# Patient Record
Sex: FEMALE | Race: WHITE | NOT HISPANIC OR LATINO | Employment: FULL TIME | URBAN - METROPOLITAN AREA
[De-identification: names, ages, dates, MRNs, and addresses within clinical notes are randomized per-mention and may not be internally consistent; named-entity substitution may affect disease eponyms.]

---

## 2017-01-06 ENCOUNTER — HOSPITAL ENCOUNTER (OUTPATIENT)
Dept: LAB | Facility: HOSPITAL | Age: 42
Setting detail: SPECIMEN
Discharge: HOME | End: 2017-01-06
Attending: NURSE PRACTITIONER | Admitting: NURSE PRACTITIONER

## 2017-01-06 LAB
ALBUMIN SERPL-MCNC: 3.6 G/DL (ref 3.5–4.8)
ALBUMIN/GLOB SERPL: 1 {RATIO} (ref 1–1.7)
ALP SERPL-CCNC: 119 IU/L (ref 32–91)
ALT SERPL-CCNC: 37 IU/L (ref 14–54)
ANION GAP SERPL CALC-SCNC: 19.9 MMOL/L (ref 10–20)
AST SERPL-CCNC: 28 IU/L (ref 15–41)
BILIRUB SERPL-MCNC: 0.5 MG/DL (ref 0.3–1.2)
BUN SERPL-MCNC: 14 MG/DL (ref 8–20)
BUN/CREAT SERPL: 20 (ref 5.4–26.2)
CALCIUM SERPL-MCNC: 9.6 MG/DL (ref 8.9–10.3)
CHLORIDE SERPL-SCNC: 100 MMOL/L (ref 101–111)
CHOLEST SERPL-MCNC: 267 MG/DL
CHOLEST/HDLC SERPL: 3.7 {RATIO}
CONV CO2: 21 MMOL/L (ref 22–32)
CONV LDL CHOLESTEROL DIRECT: 159 MG/DL (ref 0–100)
CONV MICROALBUM.,U,RANDOM: 10 MG/L
CONV TOTAL PROTEIN: 7.1 G/DL (ref 6.1–7.9)
CREAT 24H UR-MCNC: 75 MG/DL
CREAT UR-MCNC: 0.7 MG/DL (ref 0.4–1)
GLOBULIN UR ELPH-MCNC: 3.5 G/DL (ref 2.5–3.8)
GLUCOSE SERPL-MCNC: 241 MG/DL (ref 65–99)
HDLC SERPL-MCNC: 72 MG/DL
LDLC/HDLC SERPL: 2.2 {RATIO}
LIPID INTERPRETATION: ABNORMAL
MICROALBUMIN/CREAT UR: 13.3 UG/MG
POTASSIUM SERPL-SCNC: 3.9 MMOL/L (ref 3.6–5.1)
SODIUM SERPL-SCNC: 137 MMOL/L (ref 136–144)
TRIGL SERPL-MCNC: 193 MG/DL
VLDLC SERPL CALC-MCNC: 36.1 MG/DL

## 2017-01-13 ENCOUNTER — CONVERSION ENCOUNTER (OUTPATIENT)
Dept: ENDOCRINOLOGY | Facility: CLINIC | Age: 42
End: 2017-01-13

## 2017-04-12 ENCOUNTER — HOSPITAL ENCOUNTER (OUTPATIENT)
Dept: LAB | Facility: HOSPITAL | Age: 42
Setting detail: SPECIMEN
Discharge: HOME | End: 2017-04-12
Attending: INTERNAL MEDICINE | Admitting: INTERNAL MEDICINE

## 2017-04-12 LAB
ALBUMIN SERPL-MCNC: 3.7 G/DL (ref 3.5–4.8)
ALBUMIN/GLOB SERPL: 1.2 {RATIO} (ref 1–1.7)
ALP SERPL-CCNC: 102 IU/L (ref 32–91)
ALT SERPL-CCNC: 45 IU/L (ref 14–54)
ANION GAP SERPL CALC-SCNC: 14.4 MMOL/L (ref 10–20)
AST SERPL-CCNC: 37 IU/L (ref 15–41)
BILIRUB SERPL-MCNC: 0.5 MG/DL (ref 0.3–1.2)
BUN SERPL-MCNC: 19 MG/DL (ref 8–20)
BUN/CREAT SERPL: 31.7 (ref 5.4–26.2)
CALCIUM SERPL-MCNC: 8.9 MG/DL (ref 8.9–10.3)
CHLORIDE SERPL-SCNC: 100 MMOL/L (ref 101–111)
CHOLEST SERPL-MCNC: 194 MG/DL
CHOLEST/HDLC SERPL: 2.9 {RATIO}
CONV CO2: 24 MMOL/L (ref 22–32)
CONV LDL CHOLESTEROL DIRECT: 112 MG/DL (ref 0–100)
CONV MICROALBUM.,U,RANDOM: 4 MG/L
CONV TOTAL PROTEIN: 6.9 G/DL (ref 6.1–7.9)
CREAT 24H UR-MCNC: 82.1 MG/DL
CREAT UR-MCNC: 0.6 MG/DL (ref 0.4–1)
GLOBULIN UR ELPH-MCNC: 3.2 G/DL (ref 2.5–3.8)
GLUCOSE SERPL-MCNC: 235 MG/DL (ref 65–99)
HDLC SERPL-MCNC: 67 MG/DL
LDLC/HDLC SERPL: 1.7 {RATIO}
LIPID INTERPRETATION: ABNORMAL
MICROALBUMIN/CREAT UR: 4.9 UG/MG
POTASSIUM SERPL-SCNC: 4.4 MMOL/L (ref 3.6–5.1)
SODIUM SERPL-SCNC: 134 MMOL/L (ref 136–144)
TRIGL SERPL-MCNC: 119 MG/DL
VLDLC SERPL CALC-MCNC: 15.1 MG/DL

## 2017-04-19 ENCOUNTER — CONVERSION ENCOUNTER (OUTPATIENT)
Dept: ENDOCRINOLOGY | Facility: CLINIC | Age: 42
End: 2017-04-19

## 2017-12-08 ENCOUNTER — CONVERSION ENCOUNTER (OUTPATIENT)
Dept: ENDOCRINOLOGY | Facility: CLINIC | Age: 42
End: 2017-12-08

## 2018-02-27 ENCOUNTER — HOSPITAL ENCOUNTER (OUTPATIENT)
Dept: LAB | Facility: HOSPITAL | Age: 43
Setting detail: SPECIMEN
Discharge: HOME | End: 2018-02-27
Attending: INTERNAL MEDICINE | Admitting: INTERNAL MEDICINE

## 2018-02-27 LAB
ALBUMIN SERPL-MCNC: 3.9 G/DL (ref 3.5–4.8)
ALBUMIN/GLOB SERPL: 1.2 {RATIO} (ref 1–1.7)
ALP SERPL-CCNC: 95 IU/L (ref 32–91)
ALT SERPL-CCNC: 47 IU/L (ref 14–54)
ANION GAP SERPL CALC-SCNC: 12.3 MMOL/L (ref 10–20)
AST SERPL-CCNC: 34 IU/L (ref 15–41)
BILIRUB SERPL-MCNC: 0.5 MG/DL (ref 0.3–1.2)
BUN SERPL-MCNC: 12 MG/DL (ref 8–20)
BUN/CREAT SERPL: 20 (ref 5.4–26.2)
CALCIUM SERPL-MCNC: 9.4 MG/DL (ref 8.9–10.3)
CHLORIDE SERPL-SCNC: 101 MMOL/L (ref 101–111)
CHOLEST SERPL-MCNC: 165 MG/DL
CHOLEST/HDLC SERPL: 2.5 {RATIO}
CONV CO2: 26 MMOL/L (ref 22–32)
CONV LDL CHOLESTEROL DIRECT: 82 MG/DL (ref 0–100)
CONV MICROALBUM.,U,RANDOM: 3 MG/L
CONV TOTAL PROTEIN: 7.2 G/DL (ref 6.1–7.9)
CREAT 24H UR-MCNC: 96.1 MG/DL
CREAT UR-MCNC: 0.6 MG/DL (ref 0.4–1)
GLOBULIN UR ELPH-MCNC: 3.3 G/DL (ref 2.5–3.8)
GLUCOSE SERPL-MCNC: 227 MG/DL (ref 65–99)
HDLC SERPL-MCNC: 66 MG/DL
LDLC/HDLC SERPL: 1.2 {RATIO}
LIPID INTERPRETATION: NORMAL
MICROALBUMIN/CREAT UR: 3.1 UG/MG
POTASSIUM SERPL-SCNC: 4.3 MMOL/L (ref 3.6–5.1)
SODIUM SERPL-SCNC: 135 MMOL/L (ref 136–144)
TRIGL SERPL-MCNC: 68 MG/DL
VLDLC SERPL CALC-MCNC: 17.7 MG/DL

## 2018-03-08 ENCOUNTER — CONVERSION ENCOUNTER (OUTPATIENT)
Dept: ENDOCRINOLOGY | Facility: CLINIC | Age: 43
End: 2018-03-08

## 2018-11-13 ENCOUNTER — CONVERSION ENCOUNTER (OUTPATIENT)
Dept: ENDOCRINOLOGY | Facility: CLINIC | Age: 43
End: 2018-11-13

## 2019-04-30 ENCOUNTER — CONVERSION ENCOUNTER (OUTPATIENT)
Dept: ENDOCRINOLOGY | Facility: CLINIC | Age: 44
End: 2019-04-30

## 2019-06-03 VITALS
OXYGEN SATURATION: 98 % | HEIGHT: 62 IN | WEIGHT: 161 LBS | HEART RATE: 121 BPM | WEIGHT: 178 LBS | HEART RATE: 114 BPM | OXYGEN SATURATION: 99 % | BODY MASS INDEX: 29.45 KG/M2 | HEART RATE: 112 BPM | DIASTOLIC BLOOD PRESSURE: 65 MMHG | OXYGEN SATURATION: 98 % | DIASTOLIC BLOOD PRESSURE: 75 MMHG | SYSTOLIC BLOOD PRESSURE: 110 MMHG | WEIGHT: 187 LBS | WEIGHT: 171 LBS | SYSTOLIC BLOOD PRESSURE: 102 MMHG | WEIGHT: 172 LBS | HEART RATE: 109 BPM | OXYGEN SATURATION: 97 % | BODY MASS INDEX: 31.46 KG/M2 | HEIGHT: 62 IN | HEIGHT: 62 IN | BODY MASS INDEX: 32.76 KG/M2 | SYSTOLIC BLOOD PRESSURE: 112 MMHG | DIASTOLIC BLOOD PRESSURE: 78 MMHG | HEART RATE: 117 BPM | OXYGEN SATURATION: 95 % | BODY MASS INDEX: 34.41 KG/M2 | BODY MASS INDEX: 30.36 KG/M2 | DIASTOLIC BLOOD PRESSURE: 80 MMHG | SYSTOLIC BLOOD PRESSURE: 120 MMHG | SYSTOLIC BLOOD PRESSURE: 110 MMHG | WEIGHT: 166 LBS | BODY MASS INDEX: 31.47 KG/M2 | DIASTOLIC BLOOD PRESSURE: 80 MMHG | SYSTOLIC BLOOD PRESSURE: 110 MMHG | HEART RATE: 113 BPM | DIASTOLIC BLOOD PRESSURE: 70 MMHG | OXYGEN SATURATION: 98 %

## 2019-07-25 ENCOUNTER — TELEPHONE (OUTPATIENT)
Dept: ENDOCRINOLOGY | Facility: CLINIC | Age: 44
End: 2019-07-25

## 2019-07-25 NOTE — TELEPHONE ENCOUNTER
CALLED PATIENT TO RE-SCHEDULE HER 07/30 APPT WITH DR. HAYS..HAD TO LEAVE HER A MESSAGE TO CALL THE [facility]

## 2019-08-15 RX ORDER — BLOOD-GLUCOSE METER
EACH MISCELLANEOUS
Qty: 1 KIT | Refills: 0 | Status: SHIPPED | OUTPATIENT
Start: 2019-08-15 | End: 2020-02-20

## 2019-10-30 RX ORDER — BLOOD-GLUCOSE METER
EACH MISCELLANEOUS
Qty: 1 KIT | Refills: 0 | OUTPATIENT
Start: 2019-10-30

## 2019-10-30 RX ORDER — LISINOPRIL 2.5 MG/1
TABLET ORAL
Qty: 90 TABLET | Refills: 0 | Status: SHIPPED | OUTPATIENT
Start: 2019-10-30 | End: 2020-01-09

## 2019-10-30 RX ORDER — ROSUVASTATIN CALCIUM 10 MG/1
TABLET, COATED ORAL
Qty: 90 TABLET | Refills: 0 | Status: SHIPPED | OUTPATIENT
Start: 2019-10-30 | End: 2020-01-09

## 2019-12-16 ENCOUNTER — TELEPHONE (OUTPATIENT)
Dept: ENDOCRINOLOGY | Facility: CLINIC | Age: 44
End: 2019-12-16

## 2019-12-16 NOTE — TELEPHONE ENCOUNTER
PATIENT CALLED AND LEFT A VM THAT SHE NEEDS AN APPT WITH DR. HAYS... CALLED HER BACK AND I HAD TO LEAVE HER A VM TO CALL ME BACK AT THE [facility].

## 2020-01-09 RX ORDER — LISINOPRIL 2.5 MG/1
TABLET ORAL
Qty: 90 TABLET | Refills: 0 | Status: SHIPPED | OUTPATIENT
Start: 2020-01-09 | End: 2020-03-25

## 2020-01-09 RX ORDER — ROSUVASTATIN CALCIUM 10 MG/1
TABLET, COATED ORAL
Qty: 90 TABLET | Refills: 0 | Status: SHIPPED | OUTPATIENT
Start: 2020-01-09 | End: 2020-03-25

## 2020-01-15 ENCOUNTER — TELEPHONE (OUTPATIENT)
Dept: ENDOCRINOLOGY | Facility: CLINIC | Age: 45
End: 2020-01-15

## 2020-01-15 NOTE — TELEPHONE ENCOUNTER
Pt called to schedule hospital fu. Pt states that she was admitted to [facility] for DKA and discharged yeaterday. Pt states that she was told to fu with Dr. Lorenz within a week or 2. Pt scheduled 1/16 @ 3:00 (approved by Sarai DILLARD). Discharge summary and labs requested from [facility]. DC summary not available yet. Will check back tomorrow.

## 2020-01-16 ENCOUNTER — OFFICE VISIT (OUTPATIENT)
Dept: ENDOCRINOLOGY | Facility: CLINIC | Age: 45
End: 2020-01-16

## 2020-01-16 VITALS
BODY MASS INDEX: 33.68 KG/M2 | OXYGEN SATURATION: 99 % | SYSTOLIC BLOOD PRESSURE: 120 MMHG | WEIGHT: 183 LBS | HEIGHT: 62 IN | DIASTOLIC BLOOD PRESSURE: 80 MMHG | HEART RATE: 108 BPM

## 2020-01-16 DIAGNOSIS — E78.2 MIXED HYPERLIPIDEMIA: ICD-10-CM

## 2020-01-16 DIAGNOSIS — E10.65 TYPE 1 DIABETES MELLITUS WITH HYPERGLYCEMIA (HCC): Primary | ICD-10-CM

## 2020-01-16 PROBLEM — G43.109 MIGRAINE EQUIVALENT: Status: ACTIVE | Noted: 2018-03-15

## 2020-01-16 PROBLEM — E53.8 VITAMIN B 12 DEFICIENCY: Status: ACTIVE | Noted: 2019-09-23

## 2020-01-16 PROBLEM — K59.09 CHRONIC CONSTIPATION: Status: ACTIVE | Noted: 2019-03-11

## 2020-01-16 PROBLEM — M25.50 ARTHRALGIA: Status: ACTIVE | Noted: 2019-06-28

## 2020-01-16 PROBLEM — S00.91XA ABRASION OF HEAD: Status: ACTIVE | Noted: 2017-03-08

## 2020-01-16 PROBLEM — Z80.0 FAMILY HISTORY OF COLON CANCER IN MOTHER: Status: ACTIVE | Noted: 2018-03-15

## 2020-01-16 PROCEDURE — 95251 CONT GLUC MNTR ANALYSIS I&R: CPT | Performed by: INTERNAL MEDICINE

## 2020-01-16 PROCEDURE — 99214 OFFICE O/P EST MOD 30 MIN: CPT | Performed by: INTERNAL MEDICINE

## 2020-01-16 RX ORDER — CYCLOBENZAPRINE HCL 10 MG
TABLET ORAL
COMMUNITY
Start: 2019-12-30 | End: 2020-01-16

## 2020-01-16 RX ORDER — LANCING DEVICE/LANCETS
KIT MISCELLANEOUS
COMMUNITY
Start: 2017-05-05 | End: 2020-02-25

## 2020-01-16 RX ORDER — TIZANIDINE 4 MG/1
TABLET ORAL
COMMUNITY
Start: 2019-11-23 | End: 2020-01-16

## 2020-01-16 RX ORDER — DOXYCYCLINE HYCLATE 100 MG
TABLET ORAL
COMMUNITY
Start: 2019-08-28 | End: 2021-06-15

## 2020-01-16 RX ORDER — ESCITALOPRAM OXALATE 20 MG/1
TABLET ORAL
COMMUNITY
Start: 2019-12-26

## 2020-01-16 RX ORDER — QUETIAPINE FUMARATE 50 MG/1
150 TABLET, FILM COATED ORAL NIGHTLY
COMMUNITY
Start: 2019-12-30

## 2020-01-16 RX ORDER — MELOXICAM 15 MG/1
TABLET ORAL
COMMUNITY
Start: 2017-10-30 | End: 2020-01-16

## 2020-01-16 RX ORDER — TOPIRAMATE 100 MG/1
TABLET, FILM COATED ORAL EVERY 24 HOURS
COMMUNITY
Start: 2019-04-30 | End: 2020-01-16

## 2020-01-16 RX ORDER — TEMAZEPAM 7.5 MG/1
7.5 CAPSULE ORAL
COMMUNITY
End: 2020-04-16

## 2020-01-16 RX ORDER — SUMATRIPTAN 100 MG/1
TABLET, FILM COATED ORAL
COMMUNITY
Start: 2020-01-12

## 2020-01-16 RX ORDER — BUDESONIDE AND FORMOTEROL FUMARATE DIHYDRATE 160; 4.5 UG/1; UG/1
2 AEROSOL RESPIRATORY (INHALATION)
COMMUNITY
Start: 2017-03-08 | End: 2020-01-16

## 2020-01-16 RX ORDER — FLUTICASONE PROPIONATE 50 MCG
2 SPRAY, SUSPENSION (ML) NASAL
COMMUNITY
Start: 2017-12-11 | End: 2020-01-16

## 2020-01-16 RX ORDER — IBUPROFEN 800 MG/1
TABLET ORAL
COMMUNITY
Start: 2019-12-23 | End: 2020-04-16

## 2020-01-16 RX ORDER — TOPIRAMATE 50 MG/1
TABLET, FILM COATED ORAL
COMMUNITY
Start: 2019-12-01 | End: 2022-07-11

## 2020-01-16 NOTE — PROGRESS NOTES
"Endocrine Progress Note Outpatient     Patient Care Team:  Yajaira De MD as PCP - General    Chief Complaint: Follow-up type 1 diabetes    HPI: 7-year-old female with history of type 1 diabetes and hyperlipidemia is here for follow-up.  For type 1 diabetes she is currently on Medtronic 670 G system insulin pump.  She was recently hospitalized with DKA few days ago.  Etiology of that DKA is unknown at this time.  She tells me that she was using her pump on a regular basis and she did actually change her catheter as well.  Her current insulin pump settings are as follows: Basal rates: Midnight 1.50 units/h, 6 AM 1.70 units/h, 10 PM 1.50 units/h.  Insulin carb ratio is 1 unit for each 3 g of carbohydrate.  Insulin sensitive factor is 1 unit for each 20 with a target blood sugar of 140.  She is not having many low blood sugars.    Hyperlipidemia: She is currently on Crestor.  Diabetic microvascular complications: None    Past Medical History:   Diagnosis Date   • DKA (diabetic ketoacidoses) (CMS/[facility])        Social History     Socioeconomic History   • Marital status:      Spouse name: Not on file   • Number of children: Not on file   • Years of education: Not on file   • Highest education level: Not on file   Tobacco Use   • Smoking status: Former Smoker     Types: Electronic Cigarette   Substance and Sexual Activity   • Alcohol use: Never     Frequency: Never       Family History   Problem Relation Age of Onset   • Cancer Mother         rectal / colon / skin   • Heart disease Father    • Hypertension Father    • Post-traumatic stress disorder Father        Allergies   Allergen Reactions   • Azithromycin Other (See Comments)     rash  rash     • Penicillins Rash     severe rash     • Seasonal Ic  [Cholestatin] Itching     Other reaction(s): Erythema (finding)  Adhesives; \"Paper Tape is OK\" - per pt   • Erythromycin Base Rash     childhood reaction   • Latex Rash   • Sulfa Antibiotics Rash     rash   "       ROS:   Constitutional:  Denies fatigue, tiredness.    Eyes:  Denies change in visual acuity   HENT:  Denies nasal congestion or sore throat   Respiratory: denies cough, shortness of breath.   Cardiovascular:  denies chest pain, edema   GI:  Denies abdominal pain, nausea, vomiting.   Musculoskeletal:  Denies back pain or joint pain   Integument:  Denies dry skin and rash   Neurologic:  Denies headache, focal weakness or sensory changes   Endocrine:  Denies polyuria or polydipsia   Psychiatric:  Denies depression or anxiety      Vitals:    01/16/20 1458   BP: 120/80   Pulse: 108   SpO2: 99%       Physical Exam:  GEN: NAD, conversant  EYES: EOMI, PERRL, no conjunctival erythema  NECK: no thyromegaly, full ROM   CV: RRR, no murmurs/rubs/gallops, no peripheral edema  LUNG: CTAB, no wheezes/rales/ronchi  SKIN: no rashes, no acanthosis  MSK: no deformities, full ROM of all extremities  NEURO: no tremors, DTR normal  PSYCH: AOX3, appropriate mood, affect normal      Results Review:     I reviewed the patient's new clinical results.    Lab Results   Component Value Date    HGBA1C 10.1 (H) 12/23/2019      Lab Results   Component Value Date    GLUCOSE 227 (H) 02/27/2018    BUN 16 12/23/2019    CREATININE 0.7 12/23/2019    BCR 25.0 (H) 12/23/2019    K 4.0 12/23/2019    CO2 26 12/23/2019    CALCIUM 9.6 12/23/2019    ALBUMIN 4.4 12/23/2019    LABIL2 1.3 12/23/2019    AST 16 12/23/2019    ALT 17 12/23/2019    CHOL 165 02/27/2018    TRIG 69 12/23/2019     (H) 12/23/2019    HDL 58 12/23/2019     Lab Results   Component Value Date    TSH 0.524 06/27/2019    FREET4 0.90 (L) 06/27/2019     Continuous glucose monitoring system download reviewed/interpretation: Dates covered from January 3, 2022 January 16, 2020 for 14 days.  She is a spending 18% of the time in the auto mode and 82% of the time in the manual mode.  Average blood sugar while on the auto mode is 147 and average blood sugar while checking blood sugars manually  is 209.  She is spending about 88% of the time in the target range and 12% of the time in the high range.  No low blood sugars noted on this download.    Medication Review: Reviewed.       Current Outpatient Medications:   •  Adalimumab (HUMIRA PEN-CD/UC/HS STARTER) 40 MG/0.8ML Pen-injector Kit, Inject 40 mg under the skin into the appropriate area as directed 1 (One) Time Per Week., Disp: , Rfl:   •  Blood Glucose Monitoring Suppl (ACCU-CHEK NICK PLUS) w/Device kit, Check BS  5 times daily DX E10.65, Disp: 1 kit, Rfl: 0  •  doxycycline (VIBRAMYICN) 100 MG tablet, TK 1 T PO BID, Disp: , Rfl:   •  escitalopram (LEXAPRO) 20 MG tablet, , Disp: , Rfl:   •  glucagon (GLUCAGEN HYPOKIT) 1 MG injection, GLUCAGEN HYPOKIT 1 MG SOLR, Disp: , Rfl:   •  glucose blood (ACCU-CHEK NICK) test strip, Use as instructed to check blood sugar 5 times daily pt needs to be seen for further refills, Disp: 500 each, Rfl: 0  •  ibuprofen (ADVIL,MOTRIN) 800 MG tablet, , Disp: , Rfl:   •  insulin aspart (NOVOLOG) 100 UNIT/ML injection, USE SUBCUTANEOUSLY IN PUMP-ICR 12 AM 1:12; 8 AM 1:8; 5 PM 1:9 . MAY DAILY DOSE 120 UNITS (DISCARD VIAL 28 DAYS AFTER OPEN), Disp: 110 mL, Rfl: 0  •  insulin detemir (LEVEMIR FLEXTOUCH) 100 UNIT/ML injection, , Disp: , Rfl:   •  Insulin Pen Needle (B-D UF III MINI PEN NEEDLES) 31G X 5 MM misc, BD PEN NEEDLE MINI U/F 31G X 5 MM, Disp: , Rfl:   •  Lancets Misc. (ACCU-CHEK FASTCLIX LANCET) kit, ACCU-CHEK FASTCLIX LANCET KIT, Disp: , Rfl:   •  linaclotide (LINZESS) 72 MCG capsule capsule, TK 1 C PO D, Disp: , Rfl:   •  lisinopril (PRINIVIL,ZESTRIL) 2.5 MG tablet, TAKE ONE DAILY, Disp: 90 tablet, Rfl: 0  •  QUEtiapine (SEROquel) 50 MG tablet, , Disp: , Rfl:   •  rosuvastatin (CRESTOR) 10 MG tablet, TAKE ONE DAILY, Disp: 90 tablet, Rfl: 0  •  SUMAtriptan (IMITREX) 100 MG tablet, , Disp: , Rfl:   •  temazepam (RESTORIL) 7.5 MG capsule, Take 7.5 mg by mouth., Disp: , Rfl:   •  topiramate (TOPAMAX) 50 MG tablet, , Disp:  , Rfl:       Assessment/Plan   1.  Diabetes mellitus type 1: Uncontrolled with A1c of 9.7% during recent hospitalization.  I have reviewed her continuous glucose monitor download.  She does have high blood sugars from 9 AM to about 1 PM.  I have changed her basal rate at that time from 1.70 to 1.80 units/h.  So her new basal rates are as follows midnight 1.50 units/h, 6 AM 1.70 units/h, 9 AM 1.80 units/h, 1 PM 1.70 units/h and 10 PM is 1.50 units/h.  Advised her to see if she can increase her percent time on the auto mode if financially feasible.  Advised to always keep glucose source with her in case of low blood sugar and have Glucagon Emergency Kit available all the time.  Also advised to get annual eye exam and flu vaccine. Sick day rules d/w her.   2.  Hyperlipidemia: On Crestor, will follow lipid panel.            Tawanda Lorenz MD FACE.    Much of the above report is an electronic transcription/translation of the spoken language to printed text using Dragon Software. As such, the subtleties and finesse of the spoken language may permit erroneous, or at times, nonsensical words or phrases to be inadvertently transcribed; thus changes may be made at a later date to rectify these errors.

## 2020-01-16 NOTE — PATIENT INSTRUCTIONS
Please continue to watch your blood sugars especially watch for low blood sugars  Always keep glucose source with you in case of low blood sugar  Always have Glucagon Emergency Kit available  Always get annual eye exam and flu vaccine  Follow-up in 3 months with labs.

## 2020-02-20 RX ORDER — BLOOD-GLUCOSE METER
EACH MISCELLANEOUS
Qty: 1 KIT | Refills: 0 | Status: SHIPPED | OUTPATIENT
Start: 2020-02-20 | End: 2020-05-08

## 2020-02-21 RX ORDER — LANCETS
EACH MISCELLANEOUS
Qty: 450 EACH | Refills: 3 | Status: SHIPPED | OUTPATIENT
Start: 2020-02-21 | End: 2020-02-25 | Stop reason: SDUPTHER

## 2020-02-25 ENCOUNTER — TELEPHONE (OUTPATIENT)
Dept: ENDOCRINOLOGY | Facility: CLINIC | Age: 45
End: 2020-02-25

## 2020-02-25 DIAGNOSIS — E10.65 TYPE 1 DIABETES MELLITUS WITH HYPERGLYCEMIA (HCC): Primary | ICD-10-CM

## 2020-02-25 RX ORDER — LANCETS
EACH MISCELLANEOUS
Qty: 450 EACH | Refills: 3 | Status: SHIPPED | OUTPATIENT
Start: 2020-02-25 | End: 2020-03-03 | Stop reason: SDUPTHER

## 2020-02-27 ENCOUNTER — TELEPHONE (OUTPATIENT)
Dept: ENDOCRINOLOGY | Facility: CLINIC | Age: 45
End: 2020-02-27

## 2020-03-03 DIAGNOSIS — E10.65 TYPE 1 DIABETES MELLITUS WITH HYPERGLYCEMIA (HCC): ICD-10-CM

## 2020-03-03 RX ORDER — LANCETS
EACH MISCELLANEOUS
Qty: 450 EACH | Refills: 3 | Status: SHIPPED | OUTPATIENT
Start: 2020-03-03 | End: 2021-05-27 | Stop reason: CLARIF

## 2020-03-09 ENCOUNTER — DOCUMENTATION (OUTPATIENT)
Dept: ENDOCRINOLOGY | Facility: CLINIC | Age: 45
End: 2020-03-09

## 2020-03-09 ENCOUNTER — TELEPHONE (OUTPATIENT)
Dept: ENDOCRINOLOGY | Facility: CLINIC | Age: 45
End: 2020-03-09

## 2020-03-09 PROCEDURE — 95251 CONT GLUC MNTR ANALYSIS I&R: CPT | Performed by: INTERNAL MEDICINE

## 2020-03-09 NOTE — TELEPHONE ENCOUNTER
Patient called and stated she is supposed to have hip surgery for a labral tear. The surgery is scheduled for the 03/25/20. Her A1c is 9% as of 02/27/20. She was told to call our office. She is going to call in her blood sugars. I called Dr. Charly Norwood (surgeon)'s office to discuss if they are asking us for clearance or need more information.

## 2020-03-09 NOTE — TELEPHONE ENCOUNTER
Pt called asking how to download her Medtronic 670G at home.  Gave pt website to use ([email or web address]).  She is linked to our portal.  Told her to call or email us once she is successful in download and we will print reports and review.

## 2020-03-09 NOTE — TELEPHONE ENCOUNTER
Printed [facility] reports and put on MD desk for CGM interpretation. Weekend BG's better than during pt's work week (Monday through Friday).  Pt admits to being very sedentary during the week.   Basal rate changes for weekday (work days) on [facility] reports for MD approval. Scanned in this phone note as well.

## 2020-03-10 ENCOUNTER — TELEPHONE (OUTPATIENT)
Dept: ENDOCRINOLOGY | Facility: CLINIC | Age: 45
End: 2020-03-10

## 2020-03-10 NOTE — TELEPHONE ENCOUNTER
Spoke with patient, she stated has Dr. Lorenz even looked at her past labs? She stated her A1c has never been below an 8 so she doesn't know how she could get it down in 4 weeks. Patient is scheduled to come in in 5 weeks for an appointment with the MD. She started crying and swearing on the phone. She is sad and upset and is looking for some kind of pain relief. Dr. Norwood's office never called her to tell her her surgery was cancelled so she was upset that she was hearing this from me. I informed her we could contact her to make changes based on her blood sugars and that we would check labs and she can discuss everything with Dr. Lorenz. She reiterated how upset she was and that she will be in a wheelchair when she sees Dr. Lorenz next.

## 2020-03-10 NOTE — TELEPHONE ENCOUNTER
Her AVG sensor glucose is 155, this corresponds to an A1c of about 7%, I believe her sugars are better, A1c takes time to get better. Will be happy to see her sooner.

## 2020-03-10 NOTE — TELEPHONE ENCOUNTER
"Called patient and assisted her set up workday basal pattern( when in manual mode) When in automode pump will make adjustments  She able to set up new pattern and we will see what affect it has.  Patient upset that surgery has been postponed\" until A1C 8 or better\"  We will review reports and work with patient to make adjustments to improve BG readings  "

## 2020-03-10 NOTE — TELEPHONE ENCOUNTER
Spoke with Dr. Norwood's office and Dr. Norwood wants to cancel the patient's surgery until her A1c is down. (a1c below 8 and BS below 150). Please advise.

## 2020-03-12 ENCOUNTER — TREATMENT (OUTPATIENT)
Dept: ENDOCRINOLOGY | Facility: CLINIC | Age: 45
End: 2020-03-12

## 2020-03-12 DIAGNOSIS — E10.65 TYPE 1 DIABETES MELLITUS WITH HYPERGLYCEMIA (HCC): ICD-10-CM

## 2020-03-25 RX ORDER — ROSUVASTATIN CALCIUM 10 MG/1
TABLET, COATED ORAL
Qty: 90 TABLET | Refills: 1 | Status: SHIPPED | OUTPATIENT
Start: 2020-03-25 | End: 2020-08-26

## 2020-03-25 RX ORDER — LISINOPRIL 2.5 MG/1
TABLET ORAL
Qty: 90 TABLET | Refills: 1 | Status: SHIPPED | OUTPATIENT
Start: 2020-03-25 | End: 2020-08-24

## 2020-04-04 NOTE — PROGRESS NOTES
CGMS download Interpretation:   Dates covered: 2/25/2020 - 03/09/2020:  Avayo    Auto mode 46%  Manual Mode 545   She is spending 74% time in target range and 26% time in above range.

## 2020-04-09 ENCOUNTER — LAB (OUTPATIENT)
Dept: LAB | Facility: HOSPITAL | Age: 45
End: 2020-04-09

## 2020-04-09 DIAGNOSIS — E10.65 TYPE 1 DIABETES MELLITUS WITH HYPERGLYCEMIA (HCC): ICD-10-CM

## 2020-04-09 DIAGNOSIS — E78.2 MIXED HYPERLIPIDEMIA: ICD-10-CM

## 2020-04-09 LAB
ALBUMIN SERPL-MCNC: 4.3 G/DL (ref 3.5–5.2)
ALBUMIN UR-MCNC: <1.2 MG/DL
ALBUMIN/GLOB SERPL: 1.4 G/DL
ALP SERPL-CCNC: 83 U/L (ref 39–117)
ALT SERPL W P-5'-P-CCNC: 19 U/L (ref 1–33)
ANION GAP SERPL CALCULATED.3IONS-SCNC: 12.1 MMOL/L (ref 5–15)
AST SERPL-CCNC: 12 U/L (ref 1–32)
BILIRUB SERPL-MCNC: 0.2 MG/DL (ref 0.2–1.2)
BUN BLD-MCNC: 14 MG/DL (ref 6–20)
BUN/CREAT SERPL: 18.7 (ref 7–25)
CALCIUM SPEC-SCNC: 9.2 MG/DL (ref 8.6–10.5)
CHLORIDE SERPL-SCNC: 99 MMOL/L (ref 98–107)
CHOLEST SERPL-MCNC: 234 MG/DL (ref 0–200)
CO2 SERPL-SCNC: 23.9 MMOL/L (ref 22–29)
CREAT BLD-MCNC: 0.75 MG/DL (ref 0.57–1)
CREAT UR-MCNC: 115 MG/DL
GFR SERPL CREATININE-BSD FRML MDRD: 84 ML/MIN/1.73
GLOBULIN UR ELPH-MCNC: 3.1 GM/DL
GLUCOSE BLD-MCNC: 153 MG/DL (ref 65–99)
HDLC SERPL-MCNC: 64 MG/DL (ref 40–60)
LDLC SERPL CALC-MCNC: 150 MG/DL (ref 0–100)
LDLC/HDLC SERPL: 2.35 {RATIO}
MICROALBUMIN/CREAT UR: NORMAL MG/G{CREAT}
POTASSIUM BLD-SCNC: 3.9 MMOL/L (ref 3.5–5.2)
PROT SERPL-MCNC: 7.4 G/DL (ref 6–8.5)
SODIUM BLD-SCNC: 135 MMOL/L (ref 136–145)
TRIGL SERPL-MCNC: 98 MG/DL (ref 0–150)
VLDLC SERPL-MCNC: 19.6 MG/DL (ref 5–40)

## 2020-04-09 PROCEDURE — 83036 HEMOGLOBIN GLYCOSYLATED A1C: CPT

## 2020-04-09 PROCEDURE — 36415 COLL VENOUS BLD VENIPUNCTURE: CPT

## 2020-04-09 PROCEDURE — 82570 ASSAY OF URINE CREATININE: CPT

## 2020-04-09 PROCEDURE — 82043 UR ALBUMIN QUANTITATIVE: CPT

## 2020-04-09 PROCEDURE — 80061 LIPID PANEL: CPT

## 2020-04-09 PROCEDURE — 80053 COMPREHEN METABOLIC PANEL: CPT

## 2020-04-10 LAB — HBA1C MFR BLD: 8.7 % (ref 3.5–5.6)

## 2020-04-16 ENCOUNTER — TELEMEDICINE (OUTPATIENT)
Dept: ENDOCRINOLOGY | Facility: CLINIC | Age: 45
End: 2020-04-16

## 2020-04-16 VITALS
HEART RATE: 107 BPM | HEIGHT: 62 IN | BODY MASS INDEX: 34.04 KG/M2 | SYSTOLIC BLOOD PRESSURE: 147 MMHG | WEIGHT: 185 LBS | DIASTOLIC BLOOD PRESSURE: 87 MMHG

## 2020-04-16 DIAGNOSIS — E10.65 TYPE 1 DIABETES MELLITUS WITH HYPERGLYCEMIA (HCC): Primary | ICD-10-CM

## 2020-04-16 DIAGNOSIS — E78.2 MIXED HYPERLIPIDEMIA: ICD-10-CM

## 2020-04-16 PROCEDURE — 95251 CONT GLUC MNTR ANALYSIS I&R: CPT | Performed by: INTERNAL MEDICINE

## 2020-04-16 PROCEDURE — 99214 OFFICE O/P EST MOD 30 MIN: CPT | Performed by: INTERNAL MEDICINE

## 2020-04-16 RX ORDER — MELOXICAM 15 MG/1
TABLET ORAL
COMMUNITY
Start: 2020-03-17 | End: 2021-06-15

## 2020-04-16 RX ORDER — DEXTROMETHORPHAN HYDROBROMIDE AND PROMETHAZINE HYDROCHLORIDE 15; 6.25 MG/5ML; MG/5ML
SYRUP ORAL
COMMUNITY
Start: 2020-02-17 | End: 2020-04-16

## 2020-04-16 RX ORDER — BUDESONIDE AND FORMOTEROL FUMARATE DIHYDRATE 160; 4.5 UG/1; UG/1
AEROSOL RESPIRATORY (INHALATION)
COMMUNITY
Start: 2020-03-02 | End: 2020-04-16

## 2020-04-16 RX ORDER — CEPHALEXIN 500 MG/1
CAPSULE ORAL
COMMUNITY
Start: 2020-02-17 | End: 2020-04-16

## 2020-04-16 NOTE — PATIENT INSTRUCTIONS
Please change her basal rate at 1 PM to 1.80 units/h until 10 PM.  Always keep glucose source with you in case of low blood sugar  Please take your cholesterol-lowering medications on regular basis  Please check your blood pressure for 1-2 times per week and let us know the readings  Follow-up in 3 months with labs.

## 2020-04-16 NOTE — PROGRESS NOTES
Endocrine Progress Note Outpatient     Patient Care Team:  Yajaira De MD as PCP - General    Chief Complaint: Follow-up type 1 diabetes: Visit was conducted through telehealth due to coronavirus pandemic  Unable to complete visit using a video connection to the patient. A phone visit was used to complete this visits. Total time of discussion was 20 minutes.    HPI: 44-year-old female with history of type 1 diabetes and hyperlipidemia is followed through telehealth.    For type 1 diabetes she is currently on Medtronic 670 G system insulin pump.  Her current insulin pump settings are as follows: Basal rates: Midnight 1.50 units/h, 6 AM 1.70 units/h, 9 AM 1.80, 1PM 1.70, 10 PM 1.50 units/h.  Insulin carb ratio is 1 unit for each 3 g of carbohydrate.  Insulin sensitive factor is 1 unit for each 20 with a target blood sugar of 140.  She denies any significant low blood sugars.  She is planning to have a repair of tear in the right hip which is been on hold due to uncontrolled diabetes.    Hyperlipidemia: She is currently on Crestor.  She has not been taking her cholesterol-lowering medications on regular basis.    Diabetic microvascular complications: None  She is on lisinopril for renal protection.    Past Medical History:   Diagnosis Date   • DKA (diabetic ketoacidoses) (CMS/[facility])        Social History     Socioeconomic History   • Marital status:      Spouse name: Not on file   • Number of children: Not on file   • Years of education: Not on file   • Highest education level: Not on file   Tobacco Use   • Smoking status: Former Smoker     Types: Electronic Cigarette   • Smokeless tobacco: Never Used   Substance and Sexual Activity   • Alcohol use: Never     Frequency: Never       Family History   Problem Relation Age of Onset   • Cancer Mother         rectal / colon / skin   • Heart disease Father    • Hypertension Father    • Post-traumatic stress disorder Father        Allergies   Allergen Reactions   •  "Azithromycin Other (See Comments)     rash  rash     • Penicillins Rash     severe rash     • Seasonal Ic  [Cholestatin] Itching     Other reaction(s): Erythema (finding)  Adhesives; \"Paper Tape is OK\" - per pt   • Erythromycin Base Rash     childhood reaction   • Latex Rash   • Sulfa Antibiotics Rash     rash         ROS:   Constitutional:  Denies fatigue, tiredness.    Eyes:  Denies change in visual acuity   HENT:  Denies nasal congestion or sore throat   Respiratory: denies cough, shortness of breath.   Cardiovascular:  denies chest pain, edema   GI:  Denies abdominal pain, nausea, vomiting.   Musculoskeletal:  Denies back pain or joint pain   Integument:  Denies dry skin and rash   Neurologic:  Denies headache, focal weakness or sensory changes   Endocrine:  Denies polyuria or polydipsia   Psychiatric:  Denies depression or anxiety      Vitals:    04/16/20 1406   BP: 147/87   Pulse: 107       Physical Exam:  GEN: NAD, alert and oriented and able to carry on conversation.  Rest of the exam was deferred due to the telehealth visit.      Results Review:     I reviewed the patient's new clinical results.    Lab Results   Component Value Date    HGBA1C 8.7 (H) 04/09/2020    HGBA1C 9.0 (H) 02/27/2020    HGBA1C 10.1 (H) 12/23/2019      Lab Results   Component Value Date    GLUCOSE 153 (H) 04/09/2020    BUN 14 04/09/2020    CREATININE 0.75 04/09/2020    EGFRIFNONA 84 04/09/2020    BCR 18.7 04/09/2020    K 3.9 04/09/2020    CO2 23.9 04/09/2020    CALCIUM 9.2 04/09/2020    ALBUMIN 4.30 04/09/2020    LABIL2 1.3 12/23/2019    AST 12 04/09/2020    ALT 19 04/09/2020    CHOL 234 (H) 04/09/2020    TRIG 98 04/09/2020     (H) 04/09/2020    HDL 64 (H) 04/09/2020     Lab Results   Component Value Date    TSH 0.524 06/27/2019    FREET4 0.90 (L) 06/27/2019     Continuous glucose monitoring system download reviewed/interpretation: Dates covered was between April 3, 2022 April 16, 2020.  She spent about 79% of the time in the " auto mode and average blood sugar was 147.  77% the time in the target range, 22% the time in the above target range and 1% below target range.  I reviewed the glucose graph and she is having some high blood sugars from 10 AM onwards.    Medication Review: Reviewed.       Current Outpatient Medications:   •  ACCU-CHEK FASTCLIX LANCETS misc, Use to check blood sugar five times daily dx:e11.65, Disp: 450 each, Rfl: 3  •  Blood Glucose Monitoring Suppl (ACCU-CHEK NICK PLUS) w/Device kit, TEST BLOOD SUGAR 5 TIMES DAILY, Disp: 1 kit, Rfl: 0  •  Dextrose, Diabetic Use, (GLUCOSE) 77.4 % gel, Take 15 g by mouth., Disp: , Rfl:   •  doxycycline (VIBRAMYICN) 100 MG tablet, TK 1 T PO BID, Disp: , Rfl:   •  escitalopram (LEXAPRO) 20 MG tablet, , Disp: , Rfl:   •  glucagon (GLUCAGEN) 1 MG injection, USE AS DIRECTED, Disp: 1 each, Rfl: 2  •  glucose blood test strip, CHECK BLOOD SUGAR FIVE TIMES DAILY ., Disp: 450 each, Rfl: 1  •  insulin aspart (novoLOG) 100 UNIT/ML injection, USE SUBCUTANEOUSLY IN PUMP-ICR 12 AM 1:12; 8 AM 1:8; 5 PM 1:9 . MAX DAILY DOSE 120 UNITS (DISCARD VIAL 28 DAYS AFTER OPEN), Disp: 110 mL, Rfl: 1  •  insulin detemir (LEVEMIR FLEXTOUCH) 100 UNIT/ML injection, , Disp: , Rfl:   •  Insulin Pen Needle (B-D UF III MINI PEN NEEDLES) 31G X 5 MM misc, BD PEN NEEDLE MINI U/F 31G X 5 MM, Disp: , Rfl:   •  linaclotide (LINZESS) 72 MCG capsule capsule, TK 1 C PO D, Disp: , Rfl:   •  lisinopril (PRINIVIL,ZESTRIL) 2.5 MG tablet, TAKE 1 TABLET EVERY DAY, Disp: 90 tablet, Rfl: 1  •  meloxicam (MOBIC) 15 MG tablet, TK 1 T PO ONCE A DAY, Disp: , Rfl:   •  QUEtiapine (SEROquel) 50 MG tablet, , Disp: , Rfl:   •  rosuvastatin (CRESTOR) 10 MG tablet, TAKE 1 TABLET EVERY DAY, Disp: 90 tablet, Rfl: 1  •  SUMAtriptan (IMITREX) 100 MG tablet, , Disp: , Rfl:   •  topiramate (TOPAMAX) 50 MG tablet, , Disp: , Rfl:       Assessment/Plan   1.  Diabetes mellitus type 1: Uncontrolled with A1c of 8.7%.  In the last 4 weeks or have her blood  sugars have improved significantly to about 147.  She is okay to proceed with her hip surgery.  I have made following basal change.  At 1 PM she will be at one point 8 0 units/h until about 10 PM.  So her basal rate will be as follows midnight 1.50 units/h, 6 AM 1.70 units/h, 9 AM 1.80 units/h, 10 PM 1.50 units/h. Advised to always keep glucose source with her in case of low blood sugar and have Glucagon Emergency Kit available all the time.  Also advised to get annual eye exam and flu vaccine. Sick day rules d/w her.   2.  Hyperlipidemia: Uncontrolled with high LDL of 150, she has not been taking her cholesterol medications on regular basis.  She is advised to take her cholesterol medication Crestor on regular basis and will follow lipid panel.  3.  Elevated blood pressure: She is on lisinopril 2.5 mg for renal protection, advised to check blood pressure on regular basis and if it runs high then we may increase the dose of lisinopril.  She verbalized understanding.            Tawanda Lorenz MD FACE.    Much of the above report is an electronic transcription/translation of the spoken language to printed text using Dragon Software. As such, the subtleties and finesse of the spoken language may permit erroneous, or at times, nonsensical words or phrases to be inadvertently transcribed; thus changes may be made at a later date to rectify these errors.

## 2020-04-17 ENCOUNTER — TELEPHONE (OUTPATIENT)
Dept: ENDOCRINOLOGY | Facility: CLINIC | Age: 45
End: 2020-04-17

## 2020-04-17 NOTE — TELEPHONE ENCOUNTER
Can you please addend patient's televisit stating she is cleared for her surgery? She stated you were cleared for her surgery. Please advise.   
I did mention in the assessment plan that she is okay to proceed for surgery.  
I see it now, sorry. Faxed to Dr. Norwood's office per patient request.   
No significant past surgical history

## 2020-04-20 ENCOUNTER — TELEPHONE (OUTPATIENT)
Dept: ENDOCRINOLOGY | Facility: CLINIC | Age: 45
End: 2020-04-20

## 2020-04-27 RX ORDER — GLUCAGON HYDROCHLORIDE 1 MG
KIT INJECTION
Qty: 1 EACH | Refills: 2 | Status: SHIPPED | OUTPATIENT
Start: 2020-04-27 | End: 2020-07-06

## 2020-05-08 RX ORDER — BLOOD-GLUCOSE METER
EACH MISCELLANEOUS
Qty: 1 KIT | Refills: 0 | Status: SHIPPED | OUTPATIENT
Start: 2020-05-08 | End: 2021-05-27 | Stop reason: CLARIF

## 2020-07-06 RX ORDER — GLUCAGON HYDROCHLORIDE 1 MG
KIT INJECTION
Qty: 3 EACH | Refills: 1 | Status: SHIPPED | OUTPATIENT
Start: 2020-07-06 | End: 2020-12-02

## 2020-08-13 ENCOUNTER — TELEMEDICINE (OUTPATIENT)
Dept: ENDOCRINOLOGY | Facility: CLINIC | Age: 45
End: 2020-08-13

## 2020-08-13 VITALS
DIASTOLIC BLOOD PRESSURE: 86 MMHG | HEIGHT: 62 IN | BODY MASS INDEX: 34.04 KG/M2 | WEIGHT: 185 LBS | HEART RATE: 96 BPM | SYSTOLIC BLOOD PRESSURE: 126 MMHG

## 2020-08-13 DIAGNOSIS — E10.65 TYPE 1 DIABETES MELLITUS WITH HYPERGLYCEMIA (HCC): Primary | ICD-10-CM

## 2020-08-13 DIAGNOSIS — E78.2 MIXED HYPERLIPIDEMIA: ICD-10-CM

## 2020-08-13 PROCEDURE — 95251 CONT GLUC MNTR ANALYSIS I&R: CPT | Performed by: INTERNAL MEDICINE

## 2020-08-13 PROCEDURE — 99214 OFFICE O/P EST MOD 30 MIN: CPT | Performed by: INTERNAL MEDICINE

## 2020-08-13 RX ORDER — CEPHALEXIN 500 MG/1
500 CAPSULE ORAL
COMMUNITY
Start: 2020-08-05 | End: 2020-08-15

## 2020-08-13 NOTE — PATIENT INSTRUCTIONS
Always keep glucose source in case of low blood sugar  Get annual eye exam  Follow-up in 3 months with labs.  Continue to work on diet and activity.

## 2020-08-13 NOTE — PROGRESS NOTES
Endocrine Progress Note Outpatient     Patient Care Team:  Yajaria De MD as PCP - General  You have chosen to receive care through a telehealth visit.  Do you consent to use a video/audio connection for your medical care today? Yes.     Chief Complaint: Follow-up type 1 diabetes: Visit was conducted through audio and video conference utilizing doximity.       HPI: 44-year-old female with history of type 1 diabetes and hyperlipidemia is followed through telehealth.      For type 1 diabetes she is currently on Medtronic 670 G system insulin pump.  Her current insulin pump settings are as follows: Basal rates: Midnight 1.50 units/h, 6 AM 1.70 units/h, 9 AM 1.80, 1PM 1.80, 10 PM 1.50 units/h.  Insulin carb ratio is 1 unit for each 3 g of carbohydrate.  Insulin sensitive factor is 1 unit for each 20 with a target blood sugar of 140.  She denies any significant low blood sugars.  She is status post repair of tear in the right hip and has been undergoing through rehab now.  Blood sugars have improved and she denies any low blood sugars.  Hyperlipidemia: She is currently on Crestor.  She has not been taking her cholesterol-lowering medications on regular basis.    Diabetic microvascular complications: None    She is on lisinopril for renal protection.    Past Medical History:   Diagnosis Date   • DKA (diabetic ketoacidoses) (CMS/[facility])        Social History     Socioeconomic History   • Marital status:      Spouse name: Not on file   • Number of children: Not on file   • Years of education: Not on file   • Highest education level: Not on file   Tobacco Use   • Smoking status: Former Smoker     Types: Electronic Cigarette   • Smokeless tobacco: Never Used   Substance and Sexual Activity   • Alcohol use: Never     Frequency: Never       Family History   Problem Relation Age of Onset   • Cancer Mother         rectal / colon / skin   • Heart disease Father    • Hypertension Father    • Post-traumatic stress disorder  "Father        Allergies   Allergen Reactions   • Azithromycin Other (See Comments)     rash  rash     • Penicillins Rash     severe rash     • Seasonal Ic  [Cholestatin] Itching     Other reaction(s): Erythema (finding)  Adhesives; \"Paper Tape is OK\" - per pt   • Erythromycin Base Rash     childhood reaction   • Latex Rash   • Sulfa Antibiotics Rash     rash         ROS:   Constitutional:  Denies fatigue, tiredness.    Eyes:  Denies change in visual acuity   HENT:  Denies nasal congestion or sore throat   Respiratory: denies cough, shortness of breath.   Cardiovascular:  denies chest pain, edema   GI:  Denies abdominal pain, nausea, vomiting.   Musculoskeletal:  Denies back pain or joint pain   Integument:  Denies dry skin and rash   Neurologic:  Denies headache, focal weakness or sensory changes   Endocrine:  Denies polyuria or polydipsia   Psychiatric:  Denies depression or anxiety      Vitals:    08/13/20 1436   BP: 126/86   Pulse: 96       Physical Exam:  GEN: NAD, alert and oriented and able to carry on conversation.  Looks healthy on video.  Mood and affect was normal.  Breathing effort was normal.      Results Review:     I reviewed the patient's new clinical results.    Lab Results   Component Value Date    HGBA1C 7.8 (H) 05/06/2020    HGBA1C 8.7 (H) 04/09/2020    HGBA1C 9.0 (H) 02/27/2020      Lab Results   Component Value Date    GLUCOSE 153 (H) 04/09/2020    BUN 14 04/09/2020    CREATININE 0.75 04/09/2020    EGFRIFNONA 84 04/09/2020    BCR 18.7 04/09/2020    K 3.9 04/09/2020    CO2 23.9 04/09/2020    CALCIUM 9.2 04/09/2020    ALBUMIN 4.30 04/09/2020    LABIL2 1.3 12/23/2019    AST 12 04/09/2020    ALT 19 04/09/2020    CHOL 234 (H) 04/09/2020    TRIG 98 04/09/2020     (H) 04/09/2020    HDL 64 (H) 04/09/2020     Lab Results   Component Value Date    TSH 0.524 06/27/2019    FREET4 0.90 (L) 06/27/2019       CGM download interpretation: Dates covered was 7/31/2022 August 13, 2020.  She is a spending 77% " in the auto mode with average blood sugar of 151.  She is spending 75% in the target range and 25% in the above target range.  0% in the low range.  Graph reviewed showed high blood sugars from 8 AM to midnight.    Continuous glucose monitoring system download reviewed/interpretation: Dates covered was between April 3, 2022 April 16, 2020.  She spent about 79% of the time in the auto mode and average blood sugar was 147.  77% the time in the target range, 22% the time in the above target range and 1% below target range.  I reviewed the glucose graph and she is having some high blood sugars from 10 AM onwards.    Medication Review: Reviewed.       Current Outpatient Medications:   •  ACCU-CHEK FASTCLIX LANCETS misc, Use to check blood sugar five times daily dx:e11.65, Disp: 450 each, Rfl: 3  •  Blood Glucose Monitoring Suppl (ACCU-CHEK NICK PLUS) w/Device kit, USE AS DIRECTED, Disp: 1 kit, Rfl: 0  •  cephalexin (KEFLEX) 500 MG capsule, Take 500 mg by mouth., Disp: , Rfl:   •  Dextrose, Diabetic Use, (GLUCOSE) 77.4 % gel, Take 15 g by mouth., Disp: , Rfl:   •  doxycycline (VIBRAMYICN) 100 MG tablet, TK 1 T PO BID, Disp: , Rfl:   •  escitalopram (LEXAPRO) 20 MG tablet, , Disp: , Rfl:   •  GLUCAGEN HYPOKIT 1 MG injection, INJECT AS DIRECTED BY YOUR PHYSICIAN, Disp: 3 each, Rfl: 1  •  glucose blood test strip, CHECK BLOOD SUGAR FIVE TIMES DAILY ., Disp: 450 each, Rfl: 1  •  insulin aspart (NovoLOG) 100 UNIT/ML injection, USE SUBCUTANEOUSLY IN PUMP-ICR 12 AM 1:12; 8 AM 1:8; 5 PM 1:9 . MAX DAILY DOSE 120 UNITS (DISCARD VIAL 28 DAYS AFTER OPEN), Disp: 110 mL, Rfl: 1  •  insulin detemir (LEVEMIR FLEXTOUCH) 100 UNIT/ML injection, , Disp: , Rfl:   •  Insulin Pen Needle (B-D UF III MINI PEN NEEDLES) 31G X 5 MM misc, BD PEN NEEDLE MINI U/F 31G X 5 MM, Disp: , Rfl:   •  linaclotide (LINZESS) 72 MCG capsule capsule, TK 1 C PO D, Disp: , Rfl:   •  lisinopril (PRINIVIL,ZESTRIL) 2.5 MG tablet, TAKE 1 TABLET EVERY DAY, Disp: 90  tablet, Rfl: 1  •  meloxicam (MOBIC) 15 MG tablet, TK 1 T PO ONCE A DAY, Disp: , Rfl:   •  QUEtiapine (SEROquel) 50 MG tablet, , Disp: , Rfl:   •  rosuvastatin (CRESTOR) 10 MG tablet, TAKE 1 TABLET EVERY DAY, Disp: 90 tablet, Rfl: 1  •  SUMAtriptan (IMITREX) 100 MG tablet, , Disp: , Rfl:   •  topiramate (TOPAMAX) 50 MG tablet, , Disp: , Rfl:       Assessment/Plan   1.  Diabetes mellitus type 1: Uncontrolled with A1c 7.8% and no low blood sugars.  At this time I have made following changes to her basal rates.  Basal rates: Midnight 1.50 units/h, 6 AM 1.70 units/h, 8 AM 1.80 units/h, 10 PM 1.50 units/h.  Insulin carb ratio will remain at 1 unit for 3 g of carbohydrate and sensitivity factor will remain at 1 unit for each 20 with a target blood sugar of 140.  Advised to always keep glucose source with her in case of low blood sugar and have Glucagon Emergency Kit available all the time.  Also advised to get annual eye exam and flu vaccine. Sick day rules d/w her.   2.  Hyperlipidemia: On rosuvastatin, will follow lipid panel.  3.  Elevated blood pressure: Pressure at home doing well.  She is on lisinopril 2.5 mg for renal protection.             Tawanda Lorenz MD FACE.    Much of the above report is an electronic transcription/translation of the spoken language to printed text using Dragon Software. As such, the subtleties and finesse of the spoken language may permit erroneous, or at times, nonsensical words or phrases to be inadvertently transcribed; thus changes may be made at a later date to rectify these errors.

## 2020-08-24 RX ORDER — LISINOPRIL 2.5 MG/1
TABLET ORAL
Qty: 90 TABLET | Refills: 1 | Status: SHIPPED | OUTPATIENT
Start: 2020-08-24 | End: 2021-01-20 | Stop reason: SDUPTHER

## 2020-08-26 RX ORDER — ROSUVASTATIN CALCIUM 10 MG/1
TABLET, COATED ORAL
Qty: 90 TABLET | Refills: 1 | Status: SHIPPED | OUTPATIENT
Start: 2020-08-26 | End: 2021-01-22

## 2020-12-03 RX ORDER — GLUCAGON HYDROCHLORIDE 1 MG
KIT INJECTION
Qty: 3 EACH | Refills: 3 | Status: SHIPPED | OUTPATIENT
Start: 2020-12-03 | End: 2021-09-27

## 2020-12-17 RX ORDER — ADALIMUMAB 40MG/0.4ML
KIT SUBCUTANEOUS
COMMUNITY
Start: 2020-10-21 | End: 2021-06-15

## 2020-12-17 RX ORDER — CELECOXIB 200 MG/1
CAPSULE ORAL
Status: ON HOLD | COMMUNITY
Start: 2020-11-05 | End: 2022-05-11

## 2021-01-20 RX ORDER — LISINOPRIL 2.5 MG/1
TABLET ORAL
Qty: 90 TABLET | Refills: 2 | Status: SHIPPED | OUTPATIENT
Start: 2021-01-20 | End: 2021-08-30

## 2021-01-22 RX ORDER — ROSUVASTATIN CALCIUM 10 MG/1
TABLET, COATED ORAL
Qty: 90 TABLET | Refills: 0 | Status: SHIPPED | OUTPATIENT
Start: 2021-01-22 | End: 2021-04-09

## 2021-02-15 ENCOUNTER — TELEPHONE (OUTPATIENT)
Dept: ENDOCRINOLOGY | Facility: CLINIC | Age: 46
End: 2021-02-15

## 2021-02-15 NOTE — TELEPHONE ENCOUNTER
Physician order for insulin pump and testing supplies on MD desk for signature. Will fax to [facility].

## 2021-02-15 NOTE — TELEPHONE ENCOUNTER
Physicians order form for CGM on MD desk for signature. Will be faxed to [facility].

## 2021-03-03 ENCOUNTER — TELEPHONE (OUTPATIENT)
Dept: ENDOCRINOLOGY | Facility: CLINIC | Age: 46
End: 2021-03-03

## 2021-03-03 NOTE — TELEPHONE ENCOUNTER
Patient called and states she has an appointment scheduled for this summer. She is supposed to have surgery for her hip at the end of this Month. She is asking if she can go ahead and get labs done so she can see what her A1C is so she can work on getting sugars down for the surgery. Would she need to repeat labs right before appointment again if she gets them done now?

## 2021-03-04 DIAGNOSIS — E10.65 TYPE 1 DIABETES MELLITUS WITH HYPERGLYCEMIA (HCC): Primary | ICD-10-CM

## 2021-03-08 NOTE — TELEPHONE ENCOUNTER
Patient informed. She is requesting the orders be faxed to [facility]. This has been done.

## 2021-03-18 ENCOUNTER — ON CAMPUS - OUTPATIENT (AMBULATORY)
Dept: URBAN - METROPOLITAN AREA HOSPITAL 77 | Facility: HOSPITAL | Age: 46
End: 2021-03-18

## 2021-03-18 DIAGNOSIS — R11.2 NAUSEA WITH VOMITING, UNSPECIFIED: ICD-10-CM

## 2021-03-18 DIAGNOSIS — R14.0 ABDOMINAL DISTENSION (GASEOUS): ICD-10-CM

## 2021-03-18 DIAGNOSIS — K59.00 CONSTIPATION, UNSPECIFIED: ICD-10-CM

## 2021-03-18 PROCEDURE — 43235 EGD DIAGNOSTIC BRUSH WASH: CPT | Performed by: INTERNAL MEDICINE

## 2021-03-18 PROCEDURE — 99242 OFF/OP CONSLTJ NEW/EST SF 20: CPT | Performed by: NURSE PRACTITIONER

## 2021-03-19 ENCOUNTER — ON CAMPUS - OUTPATIENT (AMBULATORY)
Dept: URBAN - METROPOLITAN AREA HOSPITAL 77 | Facility: HOSPITAL | Age: 46
End: 2021-03-19

## 2021-03-19 DIAGNOSIS — R14.0 ABDOMINAL DISTENSION (GASEOUS): ICD-10-CM

## 2021-03-19 DIAGNOSIS — K59.00 CONSTIPATION, UNSPECIFIED: ICD-10-CM

## 2021-03-19 DIAGNOSIS — R11.2 NAUSEA WITH VOMITING, UNSPECIFIED: ICD-10-CM

## 2021-03-19 PROCEDURE — 99212 OFFICE O/P EST SF 10 MIN: CPT | Performed by: NURSE PRACTITIONER

## 2021-04-09 RX ORDER — ROSUVASTATIN CALCIUM 10 MG/1
TABLET, COATED ORAL
Qty: 90 TABLET | Refills: 0 | Status: SHIPPED | OUTPATIENT
Start: 2021-04-09 | End: 2021-06-24

## 2021-05-11 ENCOUNTER — OFFICE (AMBULATORY)
Dept: URBAN - METROPOLITAN AREA CLINIC 64 | Facility: CLINIC | Age: 46
End: 2021-05-11
Payer: COMMERCIAL

## 2021-05-11 VITALS
HEART RATE: 95 BPM | SYSTOLIC BLOOD PRESSURE: 124 MMHG | DIASTOLIC BLOOD PRESSURE: 74 MMHG | HEIGHT: 62 IN | WEIGHT: 204 LBS

## 2021-05-11 DIAGNOSIS — R11.2 NAUSEA WITH VOMITING, UNSPECIFIED: ICD-10-CM

## 2021-05-11 PROCEDURE — 99214 OFFICE O/P EST MOD 30 MIN: CPT | Performed by: INTERNAL MEDICINE

## 2021-05-11 RX ORDER — ONDANSETRON 4 MG/1
12 TABLET, ORALLY DISINTEGRATING ORAL
Qty: 45 | Refills: 4 | Status: ACTIVE
Start: 2021-05-11

## 2021-05-11 RX ORDER — METOCLOPRAMIDE 10 MG/1
TABLET ORAL
Qty: 120 | Refills: 3 | Status: COMPLETED
Start: 2021-05-11 | End: 2021-07-14

## 2021-05-11 RX ORDER — PANTOPRAZOLE SODIUM 40 MG/1
40 TABLET, DELAYED RELEASE ORAL
Qty: 90 | Refills: 3 | Status: COMPLETED
Start: 2021-05-11 | End: 2021-07-14

## 2021-05-11 NOTE — SERVICEHPINOTES
This is a 47 year old female, sent for evaluation by Michelle Shipley, who has hx of DM1 since 2014 is here for hospital followup of n/v. She has had intermittent episodes of n/v and epig pain. She was in ER and later hospitalized. She reportedly had EGD. She was started on reglan and ppi with improvement in symptoms. No current n/v. She has alt constipation and diarrhea. BRMarch 2021 HIDA nlBRMarch 2021 CT no [id number] EGD and colonoscopy 2008 by me which was normal. Random duodenal and colon biopsies negative.[id number] anorectal manometry for incontinence which showed weak internal and external sphincters

## 2021-05-27 RX ORDER — LANCETS
EACH MISCELLANEOUS
Qty: 550 EACH | Refills: 3 | Status: SHIPPED | OUTPATIENT
Start: 2021-05-27 | End: 2022-05-27

## 2021-05-27 RX ORDER — BLOOD SUGAR DIAGNOSTIC
STRIP MISCELLANEOUS
Qty: 550 EACH | Refills: 3 | Status: SHIPPED | OUTPATIENT
Start: 2021-05-27 | End: 2022-12-21 | Stop reason: SDUPTHER

## 2021-05-27 RX ORDER — BLOOD-GLUCOSE METER
1 EACH MISCELLANEOUS DAILY
Qty: 1 KIT | Refills: 1 | Status: SHIPPED | OUTPATIENT
Start: 2021-05-27 | End: 2021-11-10

## 2021-06-03 ENCOUNTER — TELEPHONE (OUTPATIENT)
Dept: ENDOCRINOLOGY | Facility: CLINIC | Age: 46
End: 2021-06-03

## 2021-06-03 NOTE — TELEPHONE ENCOUNTER
PA approved Approved today  PA Case: [id number], Status: Approved, Coverage Starts on: 6/3/2021 12:00:00 AM, Coverage Ends on: 6/3/2022 12:00:00 AM. Questions? Contact [phone].

## 2021-06-07 RX ORDER — POLYETHYLENE GLYCOL 3350 17 G/17G
POWDER, FOR SOLUTION ORAL
COMMUNITY
Start: 2021-03-19

## 2021-06-07 RX ORDER — CLONAZEPAM 0.5 MG/1
TABLET ORAL
COMMUNITY
Start: 2021-05-12 | End: 2021-06-15

## 2021-06-07 RX ORDER — SUCRALFATE 1 G/1
TABLET ORAL
COMMUNITY
Start: 2021-03-19 | End: 2021-06-15

## 2021-06-07 RX ORDER — METOCLOPRAMIDE 5 MG/1
TABLET ORAL
COMMUNITY
Start: 2021-03-19 | End: 2022-07-11

## 2021-06-07 RX ORDER — PANTOPRAZOLE SODIUM 40 MG/1
40 TABLET, DELAYED RELEASE ORAL EVERY MORNING
COMMUNITY
Start: 2021-05-11 | End: 2022-07-11

## 2021-06-07 RX ORDER — ONDANSETRON 4 MG/1
TABLET, ORALLY DISINTEGRATING ORAL
COMMUNITY
Start: 2021-05-11

## 2021-06-15 ENCOUNTER — OFFICE VISIT (OUTPATIENT)
Dept: ENDOCRINOLOGY | Facility: CLINIC | Age: 46
End: 2021-06-15

## 2021-06-15 ENCOUNTER — TELEPHONE (OUTPATIENT)
Dept: ENDOCRINOLOGY | Facility: CLINIC | Age: 46
End: 2021-06-15

## 2021-06-15 VITALS
BODY MASS INDEX: 37.91 KG/M2 | DIASTOLIC BLOOD PRESSURE: 75 MMHG | HEIGHT: 62 IN | SYSTOLIC BLOOD PRESSURE: 122 MMHG | OXYGEN SATURATION: 97 % | HEART RATE: 104 BPM | WEIGHT: 206 LBS | TEMPERATURE: 97.5 F

## 2021-06-15 DIAGNOSIS — E78.2 MIXED HYPERLIPIDEMIA: ICD-10-CM

## 2021-06-15 DIAGNOSIS — E10.65 TYPE 1 DIABETES MELLITUS WITH HYPERGLYCEMIA (HCC): Primary | ICD-10-CM

## 2021-06-15 LAB — GLUCOSE BLDC GLUCOMTR-MCNC: 140 MG/DL (ref 70–105)

## 2021-06-15 PROCEDURE — 99214 OFFICE O/P EST MOD 30 MIN: CPT | Performed by: INTERNAL MEDICINE

## 2021-06-15 PROCEDURE — 82962 GLUCOSE BLOOD TEST: CPT | Performed by: INTERNAL MEDICINE

## 2021-06-15 RX ORDER — INSULIN GLARGINE 100 [IU]/ML
INJECTION, SOLUTION SUBCUTANEOUS
Qty: 5 PEN | Refills: 3 | Status: SHIPPED | OUTPATIENT
Start: 2021-06-15 | End: 2022-12-21 | Stop reason: SDUPTHER

## 2021-06-15 RX ORDER — BETAMETHASONE DIPROPIONATE 0.5 MG/G
LOTION TOPICAL
COMMUNITY
Start: 2021-06-11 | End: 2022-07-11

## 2021-06-15 NOTE — PATIENT INSTRUCTIONS
Please arrange for dietitian consult  Always keep glucose source in case of low blood sugar  Annual eye exam and flu vaccine  Labs before follow-up.

## 2021-06-24 RX ORDER — ROSUVASTATIN CALCIUM 10 MG/1
TABLET, COATED ORAL
Qty: 90 TABLET | Refills: 3 | Status: SHIPPED | OUTPATIENT
Start: 2021-06-24 | End: 2022-04-13

## 2021-07-14 ENCOUNTER — OFFICE (AMBULATORY)
Dept: URBAN - METROPOLITAN AREA CLINIC 64 | Facility: CLINIC | Age: 46
End: 2021-07-14
Payer: COMMERCIAL

## 2021-07-14 VITALS
HEIGHT: 62 IN | WEIGHT: 205 LBS | HEART RATE: 85 BPM | SYSTOLIC BLOOD PRESSURE: 134 MMHG | DIASTOLIC BLOOD PRESSURE: 75 MMHG

## 2021-07-14 DIAGNOSIS — K31.84 GASTROPARESIS: ICD-10-CM

## 2021-07-14 DIAGNOSIS — Z80.0 FAMILY HISTORY OF MALIGNANT NEOPLASM OF DIGESTIVE ORGANS: ICD-10-CM

## 2021-07-14 PROCEDURE — 99213 OFFICE O/P EST LOW 20 MIN: CPT | Performed by: INTERNAL MEDICINE

## 2021-08-30 RX ORDER — LISINOPRIL 2.5 MG/1
TABLET ORAL
Qty: 90 TABLET | Refills: 3 | Status: SHIPPED | OUTPATIENT
Start: 2021-08-30 | End: 2022-06-20

## 2021-09-27 RX ORDER — GLUCAGON HYDROCHLORIDE 1 MG
KIT INJECTION
Qty: 1 EACH | Refills: 3 | Status: SHIPPED | OUTPATIENT
Start: 2021-09-27 | End: 2021-12-27

## 2021-11-10 RX ORDER — BLOOD-GLUCOSE METER
EACH MISCELLANEOUS
Qty: 1 KIT | Refills: 1 | Status: SHIPPED | OUTPATIENT
Start: 2021-11-10 | End: 2022-01-28

## 2021-12-07 RX ORDER — CLINDAMYCIN HYDROCHLORIDE 300 MG/1
CAPSULE ORAL
COMMUNITY
Start: 2021-09-04 | End: 2022-07-11

## 2021-12-07 RX ORDER — SCOLOPAMINE TRANSDERMAL SYSTEM 1 MG/1
PATCH, EXTENDED RELEASE TRANSDERMAL
COMMUNITY
Start: 2021-10-20 | End: 2022-07-11

## 2021-12-07 RX ORDER — CODEINE PHOSPHATE AND GUAIFENESIN 10; 100 MG/5ML; MG/5ML
SOLUTION ORAL
COMMUNITY
Start: 2021-10-25 | End: 2022-07-11

## 2021-12-16 ENCOUNTER — TELEPHONE (OUTPATIENT)
Dept: ENDOCRINOLOGY | Facility: CLINIC | Age: 46
End: 2021-12-16

## 2021-12-16 PROBLEM — R11.2 NAUSEA AND VOMITING: Status: ACTIVE | Noted: 2021-03-26

## 2021-12-16 PROBLEM — R10.9 ABDOMINAL PAIN: Status: ACTIVE | Noted: 2021-03-26

## 2021-12-16 PROBLEM — K59.09 CHRONIC CONSTIPATION: Status: ACTIVE | Noted: 2019-03-11

## 2021-12-16 PROBLEM — E53.8 VITAMIN B 12 DEFICIENCY: Status: ACTIVE | Noted: 2019-09-23

## 2021-12-16 PROBLEM — G43.109 MIGRAINE EQUIVALENT: Status: ACTIVE | Noted: 2018-03-15

## 2021-12-16 PROBLEM — K31.84 GASTROPARESIS: Status: ACTIVE | Noted: 2021-03-26

## 2021-12-16 PROBLEM — I10 HTN, GOAL BELOW 130/80: Status: ACTIVE | Noted: 2021-05-18

## 2021-12-27 RX ORDER — GLUCAGON HYDROCHLORIDE 1 MG
KIT INJECTION
Qty: 1 EACH | Refills: 2 | Status: SHIPPED | OUTPATIENT
Start: 2021-12-27 | End: 2022-03-09 | Stop reason: SDUPTHER

## 2022-01-28 RX ORDER — BLOOD-GLUCOSE METER
EACH MISCELLANEOUS
Qty: 1 KIT | Refills: 0 | Status: SHIPPED | OUTPATIENT
Start: 2022-01-28

## 2022-02-15 ENCOUNTER — TELEPHONE (OUTPATIENT)
Dept: ENDOCRINOLOGY | Facility: CLINIC | Age: 47
End: 2022-02-15

## 2022-02-28 ENCOUNTER — TELEPHONE (OUTPATIENT)
Dept: ENDOCRINOLOGY | Facility: CLINIC | Age: 47
End: 2022-02-28

## 2022-03-09 RX ORDER — GLUCAGON HYDROCHLORIDE 1 MG
1 KIT INJECTION ONCE
Qty: 1 EACH | Refills: 2 | Status: SHIPPED | OUTPATIENT
Start: 2022-03-09 | End: 2022-03-09

## 2022-03-09 RX ORDER — GLUCAGON HYDROCHLORIDE 1 MG
KIT INJECTION
Qty: 1 EACH | Refills: 2 | Status: SHIPPED | OUTPATIENT
Start: 2022-03-09 | End: 2022-05-17

## 2022-04-13 RX ORDER — ROSUVASTATIN CALCIUM 10 MG/1
TABLET, COATED ORAL
Qty: 90 TABLET | Refills: 4 | Status: SHIPPED | OUTPATIENT
Start: 2022-04-13

## 2022-04-22 ENCOUNTER — OFFICE (AMBULATORY)
Dept: URBAN - METROPOLITAN AREA CLINIC 64 | Facility: CLINIC | Age: 47
End: 2022-04-22

## 2022-04-22 VITALS
HEIGHT: 62 IN | HEART RATE: 107 BPM | DIASTOLIC BLOOD PRESSURE: 71 MMHG | SYSTOLIC BLOOD PRESSURE: 121 MMHG | WEIGHT: 206 LBS

## 2022-04-22 DIAGNOSIS — R19.7 DIARRHEA, UNSPECIFIED: ICD-10-CM

## 2022-04-22 DIAGNOSIS — R11.2 NAUSEA WITH VOMITING, UNSPECIFIED: ICD-10-CM

## 2022-04-22 PROCEDURE — 99214 OFFICE O/P EST MOD 30 MIN: CPT | Performed by: INTERNAL MEDICINE

## 2022-04-22 RX ORDER — ONDANSETRON 4 MG/1
12 TABLET, ORALLY DISINTEGRATING ORAL
Qty: 45 | Refills: 4 | Status: ACTIVE
Start: 2021-05-11

## 2022-04-22 RX ORDER — PANTOPRAZOLE SODIUM 20 MG/1
20 TABLET, DELAYED RELEASE ORAL
Qty: 90 | Refills: 3 | Status: COMPLETED
Start: 2022-04-22 | End: 2023-07-13

## 2022-04-22 RX ORDER — TRAMADOL HYDROCHLORIDE 50 MG/1
200 TABLET ORAL
Qty: 60 | Refills: 0 | Status: ACTIVE
Start: 2022-04-22

## 2022-04-29 ENCOUNTER — TELEPHONE (OUTPATIENT)
Dept: ENDOCRINOLOGY | Facility: CLINIC | Age: 47
End: 2022-04-29

## 2022-05-04 ENCOUNTER — TELEPHONE (OUTPATIENT)
Dept: ENDOCRINOLOGY | Facility: CLINIC | Age: 47
End: 2022-05-04

## 2022-05-04 NOTE — TELEPHONE ENCOUNTER
Pt reports BG of 283-470 with moderate to large ketones since Sunday night. Pt reports having gone to ER but not being admitted. Pt reports she has changed her site out x 2 and verified insulin is not out of date. Per physician's s/o, set temp basal of +20% x 12 hours. Pt to call back in AM with more BG numbers.

## 2022-05-11 ENCOUNTER — HOSPITAL ENCOUNTER (OUTPATIENT)
Facility: HOSPITAL | Age: 47
Setting detail: OBSERVATION
Discharge: HOME | End: 2022-05-12
Attending: EMERGENCY MEDICINE | Admitting: EMERGENCY MEDICINE

## 2022-05-11 ENCOUNTER — APPOINTMENT (OUTPATIENT)
Dept: GENERAL RADIOLOGY | Facility: HOSPITAL | Age: 47
End: 2022-05-11

## 2022-05-11 DIAGNOSIS — E11.9 TYPE 2 DIABETES MELLITUS WITHOUT COMPLICATION, WITH LONG-TERM CURRENT USE OF INSULIN: ICD-10-CM

## 2022-05-11 DIAGNOSIS — R07.9 CHEST PAIN, UNSPECIFIED TYPE: Primary | ICD-10-CM

## 2022-05-11 DIAGNOSIS — Z79.4 TYPE 2 DIABETES MELLITUS WITHOUT COMPLICATION, WITH LONG-TERM CURRENT USE OF INSULIN: ICD-10-CM

## 2022-05-11 LAB
ALBUMIN SERPL-MCNC: 4 G/DL (ref 3.5–5.2)
ALBUMIN/GLOB SERPL: 1.2 G/DL
ALP SERPL-CCNC: 116 U/L (ref 39–117)
ALT SERPL W P-5'-P-CCNC: 42 U/L (ref 1–33)
ANION GAP SERPL CALCULATED.3IONS-SCNC: 15 MMOL/L (ref 5–15)
AST SERPL-CCNC: 23 U/L (ref 1–32)
BASOPHILS # BLD AUTO: 0 10*3/MM3 (ref 0–0.2)
BASOPHILS NFR BLD AUTO: 0.7 % (ref 0–1.5)
BILIRUB SERPL-MCNC: 0.2 MG/DL (ref 0–1.2)
BUN SERPL-MCNC: 14 MG/DL (ref 6–20)
BUN/CREAT SERPL: 18.2 (ref 7–25)
CALCIUM SPEC-SCNC: 9.5 MG/DL (ref 8.6–10.5)
CHLORIDE SERPL-SCNC: 100 MMOL/L (ref 98–107)
CHOLEST SERPL-MCNC: 242 MG/DL (ref 0–200)
CO2 SERPL-SCNC: 21 MMOL/L (ref 22–29)
CREAT SERPL-MCNC: 0.77 MG/DL (ref 0.57–1)
D DIMER PPP FEU-MCNC: 0.49 MG/L (FEU) (ref 0–0.59)
DEPRECATED RDW RBC AUTO: 42.4 FL (ref 37–54)
EGFRCR SERPLBLD CKD-EPI 2021: 95.9 ML/MIN/1.73
EOSINOPHIL # BLD AUTO: 0.2 10*3/MM3 (ref 0–0.4)
EOSINOPHIL NFR BLD AUTO: 2.4 % (ref 0.3–6.2)
ERYTHROCYTE [DISTWIDTH] IN BLOOD BY AUTOMATED COUNT: 14.1 % (ref 12.3–15.4)
GLOBULIN UR ELPH-MCNC: 3.3 GM/DL
GLUCOSE BLDC GLUCOMTR-MCNC: 133 MG/DL (ref 70–105)
GLUCOSE BLDC GLUCOMTR-MCNC: 163 MG/DL (ref 70–105)
GLUCOSE BLDC GLUCOMTR-MCNC: 68 MG/DL (ref 70–105)
GLUCOSE SERPL-MCNC: 315 MG/DL (ref 65–99)
HCT VFR BLD AUTO: 39.1 % (ref 34–46.6)
HDLC SERPL-MCNC: 63 MG/DL (ref 40–60)
HGB BLD-MCNC: 13.4 G/DL (ref 12–15.9)
HOLD SPECIMEN: NORMAL
LDLC SERPL CALC-MCNC: 148 MG/DL (ref 0–100)
LDLC/HDLC SERPL: 2.28 {RATIO}
LYMPHOCYTES # BLD AUTO: 1.3 10*3/MM3 (ref 0.7–3.1)
LYMPHOCYTES NFR BLD AUTO: 18 % (ref 19.6–45.3)
MCH RBC QN AUTO: 28.9 PG (ref 26.6–33)
MCHC RBC AUTO-ENTMCNC: 34.3 G/DL (ref 31.5–35.7)
MCV RBC AUTO: 84.4 FL (ref 79–97)
MONOCYTES # BLD AUTO: 0.3 10*3/MM3 (ref 0.1–0.9)
MONOCYTES NFR BLD AUTO: 4.6 % (ref 5–12)
NEUTROPHILS NFR BLD AUTO: 5.4 10*3/MM3 (ref 1.7–7)
NEUTROPHILS NFR BLD AUTO: 74.3 % (ref 42.7–76)
NRBC BLD AUTO-RTO: 0 /100 WBC (ref 0–0.2)
PLATELET # BLD AUTO: 305 10*3/MM3 (ref 140–450)
PMV BLD AUTO: 7.4 FL (ref 6–12)
POTASSIUM SERPL-SCNC: 3.8 MMOL/L (ref 3.5–5.2)
PROT SERPL-MCNC: 7.3 G/DL (ref 6–8.5)
RBC # BLD AUTO: 4.63 10*6/MM3 (ref 3.77–5.28)
SARS-COV-2 RNA RESP QL NAA+PROBE: NOT DETECTED
SODIUM SERPL-SCNC: 136 MMOL/L (ref 136–145)
TRIGL SERPL-MCNC: 176 MG/DL (ref 0–150)
TROPONIN T SERPL-MCNC: <0.01 NG/ML (ref 0–0.03)
TROPONIN T SERPL-MCNC: <0.01 NG/ML (ref 0–0.03)
VLDLC SERPL-MCNC: 31 MG/DL (ref 5–40)
WBC NRBC COR # BLD: 7.3 10*3/MM3 (ref 3.4–10.8)

## 2022-05-11 PROCEDURE — 71045 X-RAY EXAM CHEST 1 VIEW: CPT

## 2022-05-11 PROCEDURE — 80061 LIPID PANEL: CPT | Performed by: PHYSICIAN ASSISTANT

## 2022-05-11 PROCEDURE — 0 MORPHINE SULFATE 4 MG/ML SOLUTION: Performed by: EMERGENCY MEDICINE

## 2022-05-11 PROCEDURE — 99284 EMERGENCY DEPT VISIT MOD MDM: CPT

## 2022-05-11 PROCEDURE — 25010000002 ENOXAPARIN PER 10 MG: Performed by: PHYSICIAN ASSISTANT

## 2022-05-11 PROCEDURE — 85379 FIBRIN DEGRADATION QUANT: CPT | Performed by: EMERGENCY MEDICINE

## 2022-05-11 PROCEDURE — 96375 TX/PRO/DX INJ NEW DRUG ADDON: CPT

## 2022-05-11 PROCEDURE — G0378 HOSPITAL OBSERVATION PER HR: HCPCS

## 2022-05-11 PROCEDURE — 93005 ELECTROCARDIOGRAM TRACING: CPT | Performed by: EMERGENCY MEDICINE

## 2022-05-11 PROCEDURE — 36415 COLL VENOUS BLD VENIPUNCTURE: CPT | Performed by: PHYSICIAN ASSISTANT

## 2022-05-11 PROCEDURE — 84484 ASSAY OF TROPONIN QUANT: CPT | Performed by: PHYSICIAN ASSISTANT

## 2022-05-11 PROCEDURE — 96372 THER/PROPH/DIAG INJ SC/IM: CPT

## 2022-05-11 PROCEDURE — U0003 INFECTIOUS AGENT DETECTION BY NUCLEIC ACID (DNA OR RNA); SEVERE ACUTE RESPIRATORY SYNDROME CORONAVIRUS 2 (SARS-COV-2) (CORONAVIRUS DISEASE [COVID-19]), AMPLIFIED PROBE TECHNIQUE, MAKING USE OF HIGH THROUGHPUT TECHNOLOGIES AS DESCRIBED BY CMS-2020-01-R: HCPCS | Performed by: EMERGENCY MEDICINE

## 2022-05-11 PROCEDURE — 96374 THER/PROPH/DIAG INJ IV PUSH: CPT

## 2022-05-11 PROCEDURE — 80053 COMPREHEN METABOLIC PANEL: CPT | Performed by: EMERGENCY MEDICINE

## 2022-05-11 PROCEDURE — 85025 COMPLETE CBC W/AUTO DIFF WBC: CPT | Performed by: EMERGENCY MEDICINE

## 2022-05-11 PROCEDURE — 93005 ELECTROCARDIOGRAM TRACING: CPT

## 2022-05-11 PROCEDURE — 82962 GLUCOSE BLOOD TEST: CPT

## 2022-05-11 PROCEDURE — 25010000002 ONDANSETRON PER 1 MG: Performed by: EMERGENCY MEDICINE

## 2022-05-11 PROCEDURE — 84484 ASSAY OF TROPONIN QUANT: CPT | Performed by: EMERGENCY MEDICINE

## 2022-05-11 PROCEDURE — C9803 HOPD COVID-19 SPEC COLLECT: HCPCS

## 2022-05-11 RX ORDER — SODIUM CHLORIDE 0.9 % (FLUSH) 0.9 %
10 SYRINGE (ML) INJECTION EVERY 12 HOURS SCHEDULED
Status: DISCONTINUED | OUTPATIENT
Start: 2022-05-11 | End: 2022-05-12 | Stop reason: HOSPADM

## 2022-05-11 RX ORDER — ASPIRIN 325 MG
325 TABLET ORAL ONCE
Status: DISCONTINUED | OUTPATIENT
Start: 2022-05-11 | End: 2022-05-12 | Stop reason: HOSPADM

## 2022-05-11 RX ORDER — MORPHINE SULFATE 4 MG/ML
4 INJECTION, SOLUTION INTRAMUSCULAR; INTRAVENOUS ONCE
Status: COMPLETED | OUTPATIENT
Start: 2022-05-11 | End: 2022-05-11

## 2022-05-11 RX ORDER — ACETAMINOPHEN 325 MG/1
650 TABLET ORAL EVERY 4 HOURS PRN
Status: DISCONTINUED | OUTPATIENT
Start: 2022-05-11 | End: 2022-05-12 | Stop reason: HOSPADM

## 2022-05-11 RX ORDER — HYDROXYZINE HYDROCHLORIDE 25 MG/1
25 TABLET, FILM COATED ORAL ONCE
Status: COMPLETED | OUTPATIENT
Start: 2022-05-11 | End: 2022-05-11

## 2022-05-11 RX ORDER — ENOXAPARIN SODIUM 100 MG/ML
40 INJECTION SUBCUTANEOUS EVERY 24 HOURS
Status: DISCONTINUED | OUTPATIENT
Start: 2022-05-11 | End: 2022-05-12 | Stop reason: HOSPADM

## 2022-05-11 RX ORDER — ASPIRIN 81 MG/1
81 TABLET ORAL DAILY
Status: DISCONTINUED | OUTPATIENT
Start: 2022-05-12 | End: 2022-05-12 | Stop reason: HOSPADM

## 2022-05-11 RX ORDER — DEXTROSE MONOHYDRATE 25 G/50ML
25 INJECTION, SOLUTION INTRAVENOUS
Status: DISCONTINUED | OUTPATIENT
Start: 2022-05-11 | End: 2022-05-12 | Stop reason: HOSPADM

## 2022-05-11 RX ORDER — OLANZAPINE 10 MG/2ML
1 INJECTION, POWDER, LYOPHILIZED, FOR SOLUTION INTRAMUSCULAR
Status: DISCONTINUED | OUTPATIENT
Start: 2022-05-11 | End: 2022-05-12 | Stop reason: HOSPADM

## 2022-05-11 RX ORDER — ONDANSETRON 4 MG/1
4 TABLET, FILM COATED ORAL EVERY 6 HOURS PRN
Status: DISCONTINUED | OUTPATIENT
Start: 2022-05-11 | End: 2022-05-12

## 2022-05-11 RX ORDER — ONDANSETRON 2 MG/ML
4 INJECTION INTRAMUSCULAR; INTRAVENOUS ONCE
Status: COMPLETED | OUTPATIENT
Start: 2022-05-11 | End: 2022-05-11

## 2022-05-11 RX ORDER — SODIUM CHLORIDE 0.9 % (FLUSH) 0.9 %
10 SYRINGE (ML) INJECTION AS NEEDED
Status: DISCONTINUED | OUTPATIENT
Start: 2022-05-11 | End: 2022-05-12 | Stop reason: HOSPADM

## 2022-05-11 RX ORDER — ESCITALOPRAM OXALATE 10 MG/1
20 TABLET ORAL ONCE
Status: COMPLETED | OUTPATIENT
Start: 2022-05-11 | End: 2022-05-11

## 2022-05-11 RX ORDER — SUMATRIPTAN 25 MG/1
100 TABLET, FILM COATED ORAL ONCE
Status: COMPLETED | OUTPATIENT
Start: 2022-05-11 | End: 2022-05-11

## 2022-05-11 RX ORDER — ONDANSETRON 2 MG/ML
4 INJECTION INTRAMUSCULAR; INTRAVENOUS EVERY 6 HOURS PRN
Status: DISCONTINUED | OUTPATIENT
Start: 2022-05-11 | End: 2022-05-11 | Stop reason: SDUPTHER

## 2022-05-11 RX ORDER — NITROGLYCERIN 0.4 MG/1
0.4 TABLET SUBLINGUAL
Status: DISCONTINUED | OUTPATIENT
Start: 2022-05-11 | End: 2022-05-12 | Stop reason: HOSPADM

## 2022-05-11 RX ORDER — NICOTINE POLACRILEX 4 MG
15 LOZENGE BUCCAL
Status: DISCONTINUED | OUTPATIENT
Start: 2022-05-11 | End: 2022-05-12 | Stop reason: HOSPADM

## 2022-05-11 RX ORDER — CHOLECALCIFEROL (VITAMIN D3) 125 MCG
5 CAPSULE ORAL NIGHTLY PRN
Status: DISCONTINUED | OUTPATIENT
Start: 2022-05-11 | End: 2022-05-12 | Stop reason: HOSPADM

## 2022-05-11 RX ORDER — ROSUVASTATIN CALCIUM 10 MG/1
10 TABLET, COATED ORAL ONCE
Status: COMPLETED | OUTPATIENT
Start: 2022-05-11 | End: 2022-05-11

## 2022-05-11 RX ORDER — ONDANSETRON 2 MG/ML
4 INJECTION INTRAMUSCULAR; INTRAVENOUS EVERY 6 HOURS PRN
Status: DISCONTINUED | OUTPATIENT
Start: 2022-05-11 | End: 2022-05-12 | Stop reason: HOSPADM

## 2022-05-11 RX ADMIN — MORPHINE SULFATE 4 MG: 4 INJECTION INTRAVENOUS at 11:22

## 2022-05-11 RX ADMIN — Medication 10 ML: at 17:03

## 2022-05-11 RX ADMIN — Medication 10 ML: at 21:07

## 2022-05-11 RX ADMIN — HYDROXYZINE HYDROCHLORIDE 25 MG: 25 TABLET, FILM COATED ORAL at 21:07

## 2022-05-11 RX ADMIN — SUMATRIPTAN SUCCINATE 100 MG: 25 TABLET ORAL at 22:38

## 2022-05-11 RX ADMIN — ESCITALOPRAM OXALATE 20 MG: 10 TABLET ORAL at 22:20

## 2022-05-11 RX ADMIN — ONDANSETRON 4 MG: 2 INJECTION INTRAMUSCULAR; INTRAVENOUS at 11:22

## 2022-05-11 RX ADMIN — QUETIAPINE FUMARATE 150 MG: 100 TABLET ORAL at 22:20

## 2022-05-11 RX ADMIN — ENOXAPARIN SODIUM 40 MG: 100 INJECTION SUBCUTANEOUS at 17:03

## 2022-05-11 RX ADMIN — Medication 5 MG: at 21:07

## 2022-05-11 RX ADMIN — Medication: at 16:36

## 2022-05-11 RX ADMIN — NITROGLYCERIN 0.4 MG: 0.4 TABLET SUBLINGUAL at 10:36

## 2022-05-11 RX ADMIN — ROSUVASTATIN 10 MG: 10 TABLET, FILM COATED ORAL at 22:20

## 2022-05-11 RX ADMIN — ACETAMINOPHEN 650 MG: 325 TABLET, FILM COATED ORAL at 17:46

## 2022-05-11 NOTE — ED PROVIDER NOTES
Subjective   46-year-old female reporting a 2-day history of intermittent chest pain.  She states it generally lasts for 10 to 15 minutes at a time.  She has had episodes both at rest and with activity.  She states that the pain is always associated with shortness of breath.  She states she had an episode of diaphoresis today.  She reports no fever or chills.  She denies vomiting but states she has had some nausea.  The patient states that she occasionally has elevated blood sugars.  She reports that she has had no leg swelling but does report recent decrease in exercise tolerance and increasing overall fatigue.  She reports that she has a lot of fatigue after the episodes of pain          Review of Systems   Constitutional: Positive for fatigue. Negative for chills, diaphoresis and fever.   HENT: Negative for trouble swallowing and voice change.    Eyes: Negative for discharge.   Respiratory: Positive for chest tightness and shortness of breath. Negative for cough and choking.    Cardiovascular: Positive for chest pain. Negative for palpitations and leg swelling.   Gastrointestinal: Negative for diarrhea and vomiting.   Endocrine: Positive for polydipsia.   Genitourinary: Negative for pelvic pain.   Musculoskeletal: Positive for arthralgias and myalgias. Negative for neck pain and neck stiffness.   Skin: Negative for rash.   Allergic/Immunologic: Negative for immunocompromised state.   Neurological: Negative for facial asymmetry and headaches.   Hematological: Does not bruise/bleed easily.   Psychiatric/Behavioral: The patient is not nervous/anxious.    All other systems reviewed and are negative.      Past Medical History:   Diagnosis Date   • DKA (diabetic ketoacidoses) (CMS/[facility])    States she had a stress test in the distant past.  The patient been treated for diabetes for about 10 years.  She also has a history of hypertension    Allergies   Allergen Reactions   • Azithromycin Other (See Comments)      "rash  rash     • Penicillins Rash     severe rash     • Seasonal Ic  [Cholestatin] Itching     Other reaction(s): Erythema (finding)  Adhesives; \"Paper Tape is OK\" - per pt   • Erythromycin Base Rash     childhood reaction   • Latex Rash   • Sulfa Antibiotics Rash     rash         Past Surgical History:   Procedure Laterality Date   • HIP SURGERY  05/20/2020       Family History   Problem Relation Age of Onset   • Cancer Mother         rectal / colon / skin   • Heart disease Father    • Hypertension Father    • Post-traumatic stress disorder Father    Patient's father had a heart attack in his early 70s    Social History     Socioeconomic History   • Marital status:    Tobacco Use   • Smoking status: Former Smoker     Types: Electronic Cigarette   • Smokeless tobacco: Never Used   Vaping Use   • Vaping Use: Some days   • Substances: Nicotine   • Devices: Pre-filled pod   Substance and Sexual Activity   • Alcohol use: Never   • Drug use: Defer   • Sexual activity: Defer     No recent unusual food water travel or activity      Objective   Physical Exam  Alert Roel Coma Scale 15   HEENT: Pupils equal and reactive to light. Conjunctivae are not injected. normal tympanic membranes. Oropharynx and nares are normal.   Neck: Supple. Midline trachea. No JVD. No goiter.   Chest: Clear and equal breath sounds bilaterally regular rate and rhythm without murmur or rub.  Nontender chest wall no S3 or S4   Abdomen: Positive bowel sounds nontender nondistended. No rebound or peritoneal signs. No CVA tenderness.   Extremities no clubbing cyanosis or edema motor sensory exam is normal the full range of motion is intact   skin: Warm and dry, no rashes or petechia.   Lymphatic: No regional lymphadenopathy. No calf pain, swelling or Tariq's sign    Procedures           ED Course                 Labs Reviewed   COMPREHENSIVE METABOLIC PANEL - Abnormal; Notable for the following components:       Result Value    Glucose 315 (*)  "    CO2 21.0 (*)     ALT (SGPT) 42 (*)     All other components within normal limits    Narrative:     GFR Normal >60  Chronic Kidney Disease <60  Kidney Failure <15     CBC WITH AUTO DIFFERENTIAL - Abnormal; Notable for the following components:    Lymphocyte % 18.0 (*)     Monocyte % 4.6 (*)     All other components within normal limits   TROPONIN (IN-HOUSE) - Normal    Narrative:     Troponin T Reference Range:  <= 0.03 ng/mL-   Negative for AMI  >0.03 ng/mL-     Abnormal for myocardial necrosis.  Clinicians would have to utilize clinical acumen, EKG, Troponin and serial changes to determine if it is an Acute Myocardial Infarction or myocardial injury due to an underlying chronic condition.       Results may be falsely decreased if patient taking Biotin.     D-DIMER, QUANTITATIVE - Normal    Narrative:     Reference Range  --------------------------------------------------------------------     < 0.50   Negative Predictive Value  0.50-0.59   Indeterminate    >= 0.60   Probable VTE             A very low percentage of patients with DVT may yield D-Dimer results   below the cut-off of 0.50 mg/L FEU.  This is known to be more   prevalent in patients with distal DVT.             Results of this test should always be interpreted in conjunction with   the patient's medical history, clinical presentation and other   findings.  Clinical diagnosis should not be based on the result of   INNOVANCE D-Dimer alone.   CBC AND DIFFERENTIAL    Narrative:     The following orders were created for panel order CBC & Differential.  Procedure                               Abnormality         Status                     ---------                               -----------         ------                     CBC Auto Differential[[id number]]        Abnormal            Final result                 Please view results for these tests on the individual orders.   EXTRA TUBES    Narrative:     The following orders were created for panel order  Extra Tubes.  Procedure                               Abnormality         Status                     ---------                               -----------         ------                     Gold Top - SST[[id number]]                                   Final result               Green Top (Gel)[[id number]]                                                               Please view results for these tests on the individual orders.   GOLD TOP - SST     Medications   nitroglycerin (NITROSTAT) SL tablet 0.4 mg (0.4 mg Sublingual Given 5/11/22 1036)   aspirin tablet 325 mg (325 mg Oral Not Given 5/11/22 1037)   Morphine sulfate (PF) injection 4 mg (4 mg Intravenous Given 5/11/22 1122)   ondansetron (ZOFRAN) injection 4 mg (4 mg Intravenous Given 5/11/22 1122)     XR Chest 1 View    Result Date: 5/11/2022  No acute chest finding.  Electronically Signed By-Bridgett Brasher MD On:5/11/2022 10:35 AM This report was finalized on [id number] by  Bridgett Brasher MD.                                       MDM  Number of Diagnoses or Management Options     Amount and/or Complexity of Data Reviewed  Clinical lab tests: reviewed  Tests in the radiology section of CPT®: reviewed  Tests in the medicine section of CPT®: reviewed    Risk of Complications, Morbidity, and/or Mortality  Presenting problems: high  Diagnostic procedures: high  Management options: high  General comments: Patient will be placed in observation status for serial troponin and EKG.  The patient will need a fasting lipid panel and stress Myoview.  The patient was agreeable to this plan of treatment        Final diagnoses:   None       ED Disposition  ED Disposition     None          No follow-up provider specified.       Medication List      No changes were made to your prescriptions during this visit.          Blayne Bourne MD  05/18/22 9610

## 2022-05-11 NOTE — PLAN OF CARE
Problem: Adult Inpatient Plan of Care  Goal: Plan of Care Review  Outcome: Ongoing, Progressing  Flowsheets (Taken 5/11/2022 1641)  Progress: improving  Plan of Care Reviewed With: patient  Outcome Evaluation:   new admit for chest pain. denies chest pain as of this time. Pt came in with insulin pump   insulim pump contract signed. Provide education about glucose monitoring. Safety maintained. ctm.  Goal: Patient-Specific Goal (Individualized)  Outcome: Ongoing, Progressing  Goal: Absence of Hospital-Acquired Illness or Injury  Outcome: Ongoing, Progressing  Intervention: Identify and Manage Fall Risk  Recent Flowsheet Documentation  Taken 5/11/2022 1502 by Crystal Campbell RN  Safety Promotion/Fall Prevention: safety round/check completed  Taken 5/11/2022 1450 by Crystal Campbell RN  Safety Promotion/Fall Prevention: safety round/check completed  Intervention: Prevent Skin Injury  Recent Flowsheet Documentation  Taken 5/11/2022 1450 by Crystal Campbell RN  Body Position:   position changed independently   supine  Intervention: Prevent and Manage VTE (Venous Thromboembolism) Risk  Recent Flowsheet Documentation  Taken 5/11/2022 1450 by Crystal Campbell RN  Activity Management: activity adjusted per tolerance  Goal: Optimal Comfort and Wellbeing  Outcome: Ongoing, Progressing  Intervention: Provide Person-Centered Care  Recent Flowsheet Documentation  Taken 5/11/2022 1450 by Crystal Campbell RN  Trust Relationship/Rapport: care explained  Goal: Readiness for Transition of Care  Outcome: Ongoing, Progressing  Intervention: Mutually Develop Transition Plan  Recent Flowsheet Documentation  Taken 5/11/2022 1517 by Crystal Campbell RN  Transportation Anticipated: car, drives self  Patient/Family Anticipated Services at Transition: none  Patient/Family Anticipates Transition to: home with family  Taken 5/11/2022 1512 by Crystal Campbell RN  Equipment Currently Used at Home: (INSULIN PUMP) cane,  quad   Goal Outcome Evaluation:  Plan of Care Reviewed With: patient        Progress: improving  Outcome Evaluation: new admit for chest pain. denies chest pain as of this time. Pt came in with insulin pump; insulim pump contract signed. Provide education about glucose monitoring. Safety maintained. ctm.

## 2022-05-11 NOTE — CONSULTS
Diabetes Education    Patient Name:  Karyn Zacarias  YOB: 1975  MRN: [id number]  Admit Date:  5/11/2022        MD consult for insulin pump.  Insulin Pump Contract completed and in chart: Yes  Insulin Pump Log at bedside:  Yes  LDA started: Yes  Patient Supplied Insulin Pump (orders initiated): Yes  Educator Consult entered: Yes    Pump Brand/Model: Medtronic 670G  CGMS Brand/Model: Uses the Accu-chek glucometer to check blood sugars    Type of Insulin Used:  Novolog    Basal Rate: (units/hour)  12a - 6a 1.50 U/H                                            6a - 8a 1.70 U/H                                             8a - 10p 1.90 U/H                                           10p - 12a 1.50 U/H    Bolus Rate: (insulin:carbohydrate ratio) 1:3    Last Bolus Given:  1458 10.3units                                                                Insulin Sensitivity Factor/Correction Dose: 1 unit for every 20 mg/dL     Blood Glucose Target: 140    Active Insulin: 4 hours    Date of Last Site Change:  5/10/2022    Location of Catheter: right upper abdomen    Site Assessment: clean, dry, intact    Pump supplies/insulin at bedside:  to bring in    Patient stated she was diagnosed about 10 years ago and sees Dr. Lorenz at the [facility]. Patient has no further questions or concerns related to diabetes at this time.            Electronically signed by:  Elena Alexandra RN  05/11/22 17:20 EDT

## 2022-05-11 NOTE — H&P
[facility] H&P    Patient Name: Karyn Zacarias  : 1975  MRN: [id number]  Primary Care Physician: Manda Raines APRN  Date of admission: 2022     Patient Care Team:  Manda Raines APRN as PCP - General (Nurse Practitioner)          Subjective   History Present Illness     Chief Complaint:   Chief Complaint   Patient presents with   • Chest Pain       Obtained from admitting physician HPI on 2022:  46-year-old female reporting a 2-day history of intermittent chest pain.  She states it generally lasts for 10 to 15 minutes at a time.  She has had episodes both at rest and with activity.  She states that the pain is always associated with shortness of breath.  She states she had an episode of diaphoresis today.  She reports no fever or chills.  She denies vomiting but states she has had some nausea.  The patient states that she occasionally has elevated blood sugars.  She reports that she has had no leg swelling but does report recent decrease in exercise tolerance and increasing overall fatigue.  She reports that she has a lot of fatigue after the episodes of pain    2022 (postobservation admission): Patient confirms waking this morning with chest pain primarily on the left side of her chest under her left breast radiating to her back which has been constant to some degree though waxing and waning in intensity.  She reports she was at her dermatologist office today when pain became significantly more severe and EMS was contacted.  Some diaphoresis as well as lightheadedness and dizziness were noted when pain was at its most intense.  She denies any cough, fever, nausea or vomiting, peripheral edema or recent sick contacts.  Patient does not drink alcohol and vapes with a solution that does not contain nicotine.  Family history is notable for father who experienced an MI requiring stent placement approximately 3 years ago.      Review of Systems   Constitutional: Positive for  diaphoresis. Negative for chills and fever.   HENT: Negative.    Eyes: Negative.    Cardiovascular: Positive for chest pain and palpitations. Negative for leg swelling and syncope.   Respiratory: Positive for shortness of breath. Negative for cough.    Endocrine: Negative.    Skin: Negative.    Musculoskeletal: Negative.    Gastrointestinal: Positive for heartburn. Negative for abdominal pain, hematemesis, melena, nausea and vomiting.   Genitourinary: Negative.    Neurological: Positive for light-headedness.   Psychiatric/Behavioral: Negative.            Personal History     Past Medical History:   Past Medical History:   Diagnosis Date   • DKA (diabetic ketoacidoses) (CMS/[facility])        Surgical History:      Past Surgical History:   Procedure Laterality Date   • HIP SURGERY  05/20/2020           Family History: family history includes Cancer in her mother; Heart disease in her father; Hypertension in her father; Post-traumatic stress disorder in her father. Otherwise pertinent FHx was reviewed and unremarkable.     Social History:  reports that she has quit smoking. Her smoking use included electronic cigarette. She has never used smokeless tobacco. Drug use questions deferred to the physician. She reports that she does not drink alcohol.      Medications:  Prior to Admission medications    Medication Sig Start Date End Date Taking? Authorizing Provider   Accu-Chek Softclix Lancets lancets Use to test blood sugars up to 6 times daily. DX:E10.65 5/27/21 5/27/22  Tawanda Lorenz MD   betamethasone dipropionate (DIPROLENE) 0.05 % lotion APPLY TOPICALLY TO THE SCALP EVERY DAY 6/11/21   Solange Villalta MD   Blood Glucose Monitoring Suppl (Accu-Chek Guide) w/Device kit USE AS DIRECTED 1/28/22   Tawanda Lorenz MD   celecoxib (CeleBREX) 200 MG capsule TK ONE C PO D 11/5/20   Solange Villalta MD   clindamycin (CLEOCIN) 300 MG capsule  9/4/21   Solange Villalta MD   escitalopram (LEXAPRO) 20 MG tablet   12/26/19   Solange Villalta MD   GlucaGen HypoKit 1 MG injection INJECT AS DIRECTED BY YOUR PHYSICIAN 3/9/22   Tawanda Lorenz MD   glucose blood (Accu-Chek Guide) test strip Use to test blood sugars up to 6 times daily. DX:E10.65 5/27/21   Tawanda Lorenz MD   guaiFENesin-codeine (ROMILAR-AC) 100-10 MG/5ML syrup  10/25/21   Solange Villalta MD   HYDROcodone-acetaminophen (NORCO) 7.5-325 MG per tablet TAKE 1 TABLET BY MOUTH EVERY 4 HOURS FOR 2 DAYS 12/2/21   Solange Villalta MD   insulin aspart (NovoLOG) 100 UNIT/ML injection USE SUBCUTANEOUSLY IN PUMP ICR 12 AM 1:12; 8 AM 1:8; 5 PM 1:9. MAX DAILY DOSE 120 UNITS (DISCARD VIAL 28 DAYS AFTER OPEN) 10/11/21   Tawanda Lorenz MD   Insulin Glargine (Lantus SoloStar) 100 UNIT/ML injection pen Inject 40 units sq daily. 6/15/21   Tawanda Lorenz MD   Insulin Pen Needle (B-D UF III MINI PEN NEEDLES) 31G X 5 MM misc BD PEN NEEDLE MINI U/F 31G X 5 MM 9/1/15   Solange Villalta MD   lisinopril (PRINIVIL,ZESTRIL) 2.5 MG tablet TAKE 1 TABLET EVERY DAY 8/30/21   Tawanda Lorenz MD   methocarbamol (ROBAXIN) 750 MG tablet Take 750 mg by mouth 4 (Four) Times a Day As Needed. 12/2/21   Solange Villalta MD   methylPREDNISolone (MEDROL) 4 MG dose pack FOLLOW PACKAGE DIRECTIONS TAKE WITH MEAL 12/2/21   Solange Villalta MD   metoclopramide (REGLAN) 5 MG tablet TAKE 1 TABLET BY MOUTH THREE TIMES DAILY WITH MEALS FOR 15 DAYS 3/19/21   Solange Villalta MD   naproxen (NAPROSYN) 500 MG tablet Take 500 mg by mouth 2 (Two) Times a Day. 12/2/21   Solange Villalta MD   ondansetron ODT (ZOFRAN-ODT) 4 MG disintegrating tablet DISSOLVE 1 TABLET ON THE TONGUE THREE TIMES DAILY AS NEEDED 5/11/21   Solange Villalta MD   pantoprazole (PROTONIX) 40 MG EC tablet Take 40 mg by mouth Every Morning. 5/11/21   Provider, MD Solange   polyethylene glycol (MIRALAX) 17 GM/SCOOP powder DISSOLVE 17 GRAMS IN LIQUID AND DRINK BY MOUTH TWICE DAILY 3/19/21   Provider,  "MD Solange   promethazine-dextromethorphan (PROMETHAZINE-DM) 6.25-15 MG/5ML syrup TAKE 5 ML BY MOUTH FOUR TIMES DAILY AS NEEDED FOR COUGH 12/9/21   Solange Villalta MD   QUEtiapine (SEROquel) 50 MG tablet  12/30/19   Solange Villalta MD   rosuvastatin (CRESTOR) 10 MG tablet TAKE 1 TABLET EVERY DAY 4/13/22   Thaddeus Parson MD   Scopolamine 1 MG/3DAYS patch  10/20/21   Solange Villalta MD   SUMAtriptan (IMITREX) 100 MG tablet  1/12/20   Solange Villalta MD   topiramate (TOPAMAX) 50 MG tablet  12/1/19   Solange Villalta MD       Allergies:    Allergies   Allergen Reactions   • Azithromycin Other (See Comments)     rash  rash     • Penicillins Rash     severe rash     • Seasonal Ic  [Cholestatin] Itching     Other reaction(s): Erythema (finding)  Adhesives; \"Paper Tape is OK\" - per pt   • Erythromycin Base Rash     childhood reaction   • Latex Rash   • Sulfa Antibiotics Rash     rash         Objective   Objective     Vital Signs  Temp:  [98.5 °F (36.9 °C)] 98.5 °F (36.9 °C)  Heart Rate:  [] 98  Resp:  [13-19] 13  BP: (108-130)/(68-85) 116/85  SpO2:  [92 %-97 %] 94 %  on   ;   Device (Oxygen Therapy): room air  Body mass index is 36.58 kg/m².    Physical Exam  Vitals reviewed.   Constitutional:       General: She is not in acute distress.     Appearance: Normal appearance. She is obese. She is not ill-appearing, toxic-appearing or diaphoretic.   HENT:      Head: Normocephalic and atraumatic.      Right Ear: External ear normal.      Left Ear: External ear normal.      Nose: Nose normal.      Mouth/Throat:      Mouth: Mucous membranes are moist.   Eyes:      Extraocular Movements: Extraocular movements intact.   Cardiovascular:      Rate and Rhythm: Normal rate and regular rhythm.      Pulses: Normal pulses.      Heart sounds: Normal heart sounds.   Pulmonary:      Effort: Pulmonary effort is normal.      Breath sounds: Normal breath sounds.   Abdominal:      General: Bowel sounds " are normal. There is no distension.      Palpations: Abdomen is soft.      Tenderness: There is no abdominal tenderness.   Musculoskeletal:         General: Normal range of motion.      Cervical back: Normal range of motion.      Right lower leg: No edema.      Left lower leg: No edema.   Skin:     General: Skin is warm and dry.   Neurological:      General: No focal deficit present.      Mental Status: She is alert and oriented to person, place, and time.   Psychiatric:         Mood and Affect: Mood normal.         Behavior: Behavior normal.         Thought Content: Thought content normal.         Judgment: Judgment normal.           Results Review:  I have personally reviewed most recent cardiac tracings, lab results and radiology images and interpretations and agree with findings, most notably: Troponin, CMP, CBC, D-dimer, chest x-ray and EKG.    Results from last 7 days   Lab Units 05/11/22  1038   WBC 10*3/mm3 7.30   HEMOGLOBIN g/dL 13.4   HEMATOCRIT % 39.1   PLATELETS 10*3/mm3 305     Results from last 7 days   Lab Units 05/11/22  1038   SODIUM mmol/L 136   POTASSIUM mmol/L 3.8   CHLORIDE mmol/L 100   CO2 mmol/L 21.0*   BUN mg/dL 14   CREATININE mg/dL 0.77   GLUCOSE mg/dL 315*   CALCIUM mg/dL 9.5   ALT (SGPT) U/L 42*   AST (SGOT) U/L 23   TROPONIN T ng/mL <0.010     Estimated Creatinine Clearance: 94.5 mL/min (by C-G formula based on SCr of 0.77 mg/dL).  Brief Urine Lab Results     None          Microbiology Results (last 10 days)     ** No results found for the last 240 hours. **          ECG/EMG Results (most recent)     Procedure Component Value Units Date/Time    ECG 12 Lead [[id number]] Collected: 05/11/22 1006     Updated: 05/11/22 1009     QT Interval 339 ms     Narrative:      HEART RATE= 98  bpm  RR Interval= 608  ms  IL Interval= 132  ms  P Horizontal Axis= 16  deg  P Front Axis= 43  deg  QRSD Interval= 88  ms  QT Interval= 339  ms  QRS Axis= 86  deg  T Wave Axis= 46  deg  - NORMAL ECG -  Sinus  rhythm  When compared with ECG of 26-Nov-2016 14:43:44,  No significant change  Electronically Signed By:   Date and Time of Study: 2022-05-11 10:06:50                  XR Chest 1 View    Result Date: 5/11/2022  No acute chest finding.  Electronically Signed By-Bridgett Brasher MD On:5/11/2022 10:35 AM This report was finalized on [id number] by  Bridegtt Brasher MD.        Estimated Creatinine Clearance: 94.5 mL/min (by C-G formula based on SCr of 0.77 mg/dL).    Assessment & Plan   Assessment/Plan       There are no hospital problems to display for this patient.    Chest pain  -Initial troponin: Less than 0.010  -D-dimer: 0.49  -Chest X-Ray: No acute finding  -EKG shows sinus rhythm at 98 without obvious acute ST changes or ectopy and a QTC of 435 ms  -In the ED pt given 325 mg aspirin, morphine, Zofran and nitroglycerin  -healthy heart diet for now, NPO at midnight  -Check lipid panel  -81mg ASA daily  -N.p.o. at midnight  -Stress Test ordered  -Telemetry    Diabetes mellitus  -Poorly controlled with a most recent glucose of 315  -Patient may continue home insulin pump  -Diabetes educator consulted  -Diabetic diet  -Monitor AC and HS    Hypertension  -Well controlled with a most recent blood pressure of 116/85  - Continue lisinopril  - Monitor while admitted    GERD  -PPI    Hyperlipidemia  -Statin    Depression/anxiety  -Seroquel, Lexapro    Chronic pain  -Continue home meds    Obesity (BMI: 36.58)  -Encourage diet lifestyle modifications            VTE Prophylaxis -   Mechanical Order History:     None      Pharmalogical Order History:     None          CODE STATUS:    There are no questions and answers to display.       This patient has been examined wearing personal protective equipment.     I discussed the patient's findings and my recommendations with patient and nursing staff.      Signature:Electronically signed by Abiodun Li PA-C, 05/11/22, 1:57 PM EDT.

## 2022-05-12 ENCOUNTER — APPOINTMENT (OUTPATIENT)
Dept: NUCLEAR MEDICINE | Facility: HOSPITAL | Age: 47
End: 2022-05-12

## 2022-05-12 VITALS
HEART RATE: 100 BPM | HEIGHT: 62 IN | BODY MASS INDEX: 37.08 KG/M2 | SYSTOLIC BLOOD PRESSURE: 125 MMHG | WEIGHT: 201.5 LBS | OXYGEN SATURATION: 96 % | RESPIRATION RATE: 16 BRPM | TEMPERATURE: 97.7 F | DIASTOLIC BLOOD PRESSURE: 81 MMHG

## 2022-05-12 LAB
ANION GAP SERPL CALCULATED.3IONS-SCNC: 14 MMOL/L (ref 5–15)
BASOPHILS # BLD AUTO: 0.1 10*3/MM3 (ref 0–0.2)
BASOPHILS NFR BLD AUTO: 0.8 % (ref 0–1.5)
BH CV REST NUCLEAR ISOTOPE DOSE: 7.6 MCI
BH CV STRESS BP STAGE 1: NORMAL
BH CV STRESS DURATION MIN STAGE 1: 6
BH CV STRESS DURATION SEC STAGE 1: 36
BH CV STRESS GRADE STAGE 1: 0
BH CV STRESS HR STAGE 1: 147
BH CV STRESS METS STAGE 1: 2.3
BH CV STRESS NUCLEAR ISOTOPE DOSE: 21.9 MCI
BH CV STRESS PROTOCOL 1: NORMAL
BH CV STRESS RECOVERY BP: NORMAL MMHG
BH CV STRESS RECOVERY HR: 107 BPM
BH CV STRESS SPEED STAGE 1: 1.7
BH CV STRESS STAGE 1: 1
BUN SERPL-MCNC: 15 MG/DL (ref 6–20)
BUN/CREAT SERPL: 16.9 (ref 7–25)
CALCIUM SPEC-SCNC: 9.1 MG/DL (ref 8.6–10.5)
CHLORIDE SERPL-SCNC: 101 MMOL/L (ref 98–107)
CO2 SERPL-SCNC: 22 MMOL/L (ref 22–29)
CREAT SERPL-MCNC: 0.89 MG/DL (ref 0.57–1)
DEPRECATED RDW RBC AUTO: 42.4 FL (ref 37–54)
EGFRCR SERPLBLD CKD-EPI 2021: 80.6 ML/MIN/1.73
EOSINOPHIL # BLD AUTO: 0.2 10*3/MM3 (ref 0–0.4)
EOSINOPHIL NFR BLD AUTO: 3.3 % (ref 0.3–6.2)
ERYTHROCYTE [DISTWIDTH] IN BLOOD BY AUTOMATED COUNT: 14.3 % (ref 12.3–15.4)
GLUCOSE BLDC GLUCOMTR-MCNC: 109 MG/DL (ref 70–105)
GLUCOSE BLDC GLUCOMTR-MCNC: 247 MG/DL (ref 70–105)
GLUCOSE SERPL-MCNC: 204 MG/DL (ref 65–99)
HCT VFR BLD AUTO: 37.7 % (ref 34–46.6)
HGB BLD-MCNC: 12.9 G/DL (ref 12–15.9)
LV EF NUC BP: 76 %
LYMPHOCYTES # BLD AUTO: 2 10*3/MM3 (ref 0.7–3.1)
LYMPHOCYTES NFR BLD AUTO: 26.8 % (ref 19.6–45.3)
MAGNESIUM SERPL-MCNC: 1.8 MG/DL (ref 1.6–2.6)
MAXIMAL PREDICTED HEART RATE: 173 BPM
MCH RBC QN AUTO: 28.9 PG (ref 26.6–33)
MCHC RBC AUTO-ENTMCNC: 34.3 G/DL (ref 31.5–35.7)
MCV RBC AUTO: 84.3 FL (ref 79–97)
MONOCYTES # BLD AUTO: 0.3 10*3/MM3 (ref 0.1–0.9)
MONOCYTES NFR BLD AUTO: 4.5 % (ref 5–12)
NEUTROPHILS NFR BLD AUTO: 4.9 10*3/MM3 (ref 1.7–7)
NEUTROPHILS NFR BLD AUTO: 64.6 % (ref 42.7–76)
NRBC BLD AUTO-RTO: 0.1 /100 WBC (ref 0–0.2)
PERCENT MAX PREDICTED HR: 84.97 %
PLATELET # BLD AUTO: 284 10*3/MM3 (ref 140–450)
PMV BLD AUTO: 7.7 FL (ref 6–12)
POTASSIUM SERPL-SCNC: 4.2 MMOL/L (ref 3.5–5.2)
RBC # BLD AUTO: 4.47 10*6/MM3 (ref 3.77–5.28)
SODIUM SERPL-SCNC: 137 MMOL/L (ref 136–145)
STRESS BASELINE BP: NORMAL MMHG
STRESS BASELINE HR: 109 BPM
STRESS PERCENT HR: 100 %
STRESS POST ESTIMATED WORKLOAD: 4.6 METS
STRESS POST EXERCISE DUR MIN: 6 MIN
STRESS POST EXERCISE DUR SEC: 36 SEC
STRESS POST PEAK BP: NORMAL MMHG
STRESS POST PEAK HR: 147 BPM
STRESS TARGET HR: 147 BPM
WBC NRBC COR # BLD: 7.5 10*3/MM3 (ref 3.4–10.8)

## 2022-05-12 PROCEDURE — A9502 TC99M TETROFOSMIN: HCPCS | Performed by: EMERGENCY MEDICINE

## 2022-05-12 PROCEDURE — 85025 COMPLETE CBC W/AUTO DIFF WBC: CPT | Performed by: PHYSICIAN ASSISTANT

## 2022-05-12 PROCEDURE — G0378 HOSPITAL OBSERVATION PER HR: HCPCS

## 2022-05-12 PROCEDURE — 80048 BASIC METABOLIC PNL TOTAL CA: CPT | Performed by: PHYSICIAN ASSISTANT

## 2022-05-12 PROCEDURE — 83735 ASSAY OF MAGNESIUM: CPT | Performed by: PHYSICIAN ASSISTANT

## 2022-05-12 PROCEDURE — 93018 CV STRESS TEST I&R ONLY: CPT | Performed by: INTERNAL MEDICINE

## 2022-05-12 PROCEDURE — 93017 CV STRESS TEST TRACING ONLY: CPT

## 2022-05-12 PROCEDURE — 82962 GLUCOSE BLOOD TEST: CPT

## 2022-05-12 PROCEDURE — 78452 HT MUSCLE IMAGE SPECT MULT: CPT | Performed by: INTERNAL MEDICINE

## 2022-05-12 PROCEDURE — 78452 HT MUSCLE IMAGE SPECT MULT: CPT

## 2022-05-12 PROCEDURE — 0 TECHNETIUM TETROFOSMIN KIT: Performed by: EMERGENCY MEDICINE

## 2022-05-12 RX ORDER — ESCITALOPRAM OXALATE 10 MG/1
20 TABLET ORAL DAILY
Status: DISCONTINUED | OUTPATIENT
Start: 2022-05-12 | End: 2022-05-12 | Stop reason: HOSPADM

## 2022-05-12 RX ORDER — INSULIN GLARGINE 100 [IU]/ML
30 INJECTION, SOLUTION SUBCUTANEOUS NIGHTLY
Refills: 3 | Status: DISCONTINUED | OUTPATIENT
Start: 2022-05-12 | End: 2022-05-12 | Stop reason: HOSPADM

## 2022-05-12 RX ORDER — QUETIAPINE FUMARATE 100 MG/1
100 TABLET, FILM COATED ORAL NIGHTLY
Status: DISCONTINUED | OUTPATIENT
Start: 2022-05-12 | End: 2022-05-12 | Stop reason: HOSPADM

## 2022-05-12 RX ORDER — ONDANSETRON 4 MG/1
4 TABLET, ORALLY DISINTEGRATING ORAL EVERY 8 HOURS PRN
Status: DISCONTINUED | OUTPATIENT
Start: 2022-05-12 | End: 2022-05-12 | Stop reason: HOSPADM

## 2022-05-12 RX ORDER — SUMATRIPTAN 50 MG/1
100 TABLET, FILM COATED ORAL DAILY PRN
Status: DISCONTINUED | OUTPATIENT
Start: 2022-05-12 | End: 2022-05-12 | Stop reason: HOSPADM

## 2022-05-12 RX ORDER — ROSUVASTATIN CALCIUM 10 MG/1
40 TABLET, COATED ORAL NIGHTLY
Status: DISCONTINUED | OUTPATIENT
Start: 2022-05-12 | End: 2022-05-12 | Stop reason: HOSPADM

## 2022-05-12 RX ADMIN — ESCITALOPRAM OXALATE 20 MG: 10 TABLET ORAL at 08:32

## 2022-05-12 RX ADMIN — Medication 10 ML: at 08:32

## 2022-05-12 RX ADMIN — TETROFOSMIN 1 DOSE: 1.38 INJECTION, POWDER, LYOPHILIZED, FOR SOLUTION INTRAVENOUS at 08:15

## 2022-05-12 RX ADMIN — ASPIRIN 81 MG: 81 TABLET, COATED ORAL at 08:32

## 2022-05-12 RX ADMIN — ACETAMINOPHEN 650 MG: 325 TABLET, FILM COATED ORAL at 09:11

## 2022-05-12 RX ADMIN — TETROFOSMIN 1 DOSE: 1.38 INJECTION, POWDER, LYOPHILIZED, FOR SOLUTION INTRAVENOUS at 06:39

## 2022-05-12 NOTE — CASE MANAGEMENT/SOCIAL WORK
Case Management Discharge Note                Selected Continued Care - Discharged on 5/12/2022 Admission date: 5/11/2022 - Discharge disposition: Home or Self Care            Transportation Services  Private: Car    Final Discharge Disposition Code: 01 - home or self-care

## 2022-05-12 NOTE — PLAN OF CARE
Problem: Adult Inpatient Plan of Care  Goal: Plan of Care Review  5/12/2022 1124 by Robin Leigh RN  Outcome: Met  5/12/2022 1123 by Robin Leigh RN  Outcome: Ongoing, Progressing  Goal: Patient-Specific Goal (Individualized)  5/12/2022 1124 by Robin Leigh RN  Outcome: Met  5/12/2022 1123 by Robin Leigh RN  Outcome: Ongoing, Progressing  Goal: Absence of Hospital-Acquired Illness or Injury  5/12/2022 1124 by Robin Leigh RN  Outcome: Met  5/12/2022 1123 by Robin Leigh RN  Outcome: Ongoing, Progressing  Intervention: Identify and Manage Fall Risk  Intervention: Prevent Infection  Goal: Optimal Comfort and Wellbeing  5/12/2022 1124 by Robin Leigh RN  Outcome: Met  5/12/2022 1123 by Robin Leigh RN  Outcome: Ongoing, Progressing  Intervention: Provide Person-Centered Care  Goal: Readiness for Transition of Care  5/12/2022 1124 by Robin Leigh RN  Outcome: Met  5/12/2022 1123 by Robin Leigh RN  Outcome: Ongoing, Progressing     Problem: Chest Pain  Goal: Resolution of Chest Pain Symptoms  5/12/2022 1124 by Robin Leigh RN  Outcome: Met  5/12/2022 1123 by Robin Leigh RN  Outcome: Ongoing, Progressing   Goal Outcome Evaluation:

## 2022-05-12 NOTE — PLAN OF CARE
Problem: Adult Inpatient Plan of Care  Goal: Plan of Care Review  5/12/2022 0356 by Monse Ralph RN  Outcome: Ongoing, Progressing  5/11/2022 2047 by Monse Ralph RN  Outcome: Ongoing, Progressing  Goal: Patient-Specific Goal (Individualized)  5/12/2022 0356 by Monse Ralph RN  Outcome: Ongoing, Progressing  5/11/2022 2047 by Monse Ralph RN  Outcome: Ongoing, Progressing  Goal: Absence of Hospital-Acquired Illness or Injury  5/12/2022 0356 by Monse Ralph RN  Outcome: Ongoing, Progressing  5/11/2022 2047 by Monse Ralph RN  Outcome: Ongoing, Progressing  Intervention: Identify and Manage Fall Risk  Recent Flowsheet Documentation  Taken 5/12/2022 0300 by Monse Ralph RN  Safety Promotion/Fall Prevention: safety round/check completed  Taken 5/12/2022 0100 by Monse Ralph RN  Safety Promotion/Fall Prevention: safety round/check completed  Taken 5/11/2022 2300 by Monse Ralph RN  Safety Promotion/Fall Prevention: safety round/check completed  Taken 5/11/2022 2102 by Monse Ralph RN  Safety Promotion/Fall Prevention: safety round/check completed  Taken 5/11/2022 1930 by Monse Ralph RN  Safety Promotion/Fall Prevention: safety round/check completed  Intervention: Prevent Skin Injury  Recent Flowsheet Documentation  Taken 5/11/2022 1930 by Monse Ralph RN  Skin Protection: adhesive use limited  Intervention: Prevent and Manage VTE (Venous Thromboembolism) Risk  Recent Flowsheet Documentation  Taken 5/11/2022 1930 by Monse Ralph RN  VTE Prevention/Management:   bilateral   dorsiflexion/plantar flexion performed  Intervention: Prevent Infection  Recent Flowsheet Documentation  Taken 5/12/2022 0300 by Monse Ralph RN  Infection Prevention:   single patient room provided   rest/sleep promoted   hand hygiene promoted  Taken 5/12/2022 0100 by Monse Ralph RN  Infection Prevention:   single patient room provided   rest/sleep promoted   hand hygiene promoted  Taken 5/11/2022 2102 by Monse Ralph RN  Infection  Prevention:   rest/sleep promoted   single patient room provided   hand hygiene promoted  Taken 5/11/2022 1930 by Monse Ralph RN  Infection Prevention:   rest/sleep promoted   single patient room provided   hand hygiene promoted  Goal: Optimal Comfort and Wellbeing  5/12/2022 0356 by Monse Ralph RN  Outcome: Ongoing, Progressing  5/11/2022 2047 by Monse aRlph RN  Outcome: Ongoing, Progressing  Intervention: Provide Person-Centered Care  Recent Flowsheet Documentation  Taken 5/11/2022 1930 by Monse Ralph RN  Trust Relationship/Rapport:   care explained   questions answered   questions encouraged   thoughts/feelings acknowledged  Goal: Readiness for Transition of Care  5/12/2022 0356 by Monse Ralph RN  Outcome: Ongoing, Progressing  5/11/2022 2047 by Monse Ralph RN  Outcome: Ongoing, Progressing   Goal Outcome Evaluation:            Patient resting in bed.  Complaints of a migraine earlier this shift that was resolved with home dose of imitrex.  Patient was very anxious earlier this shift. MD notified and new orders placed.  Vitals have been stable and patient is on room air.  Safety precautions in use.  Will continue to monitor.

## 2022-05-12 NOTE — DISCHARGE SUMMARY
[facility]    Manda Raines APRN    CHIEF COMPLAINT:     Chest pain     HISTORY OF PRESENT ILLNESS:    HPI     ED note 5/11/22: 46-year-old female reporting a 2-day history of intermittent chest pain.  She states it generally lasts for 10 to 15 minutes at a time.  She has had episodes both at rest and with activity.  She states that the pain is always associated with shortness of breath.  She states she had an episode of diaphoresis today.  She reports no fever or chills.  She denies vomiting but states she has had some nausea.  The patient states that she occasionally has elevated blood sugars.  She reports that she has had no leg swelling but does report recent decrease in exercise tolerance and increasing overall fatigue.  She reports that she has a lot of fatigue after the episodes of pain          Past Medical History:   Diagnosis Date   • DKA (diabetic ketoacidoses) (CMS/HCC)      Past Surgical History:   Procedure Laterality Date   • HIP SURGERY  05/20/2020     Family History   Problem Relation Age of Onset   • Cancer Mother         rectal / colon / skin   • Heart disease Father    • Hypertension Father    • Post-traumatic stress disorder Father      Social History     Tobacco Use   • Smoking status: Former Smoker     Types: Electronic Cigarette   • Smokeless tobacco: Never Used   Vaping Use   • Vaping Use: Some days   • Substances: Nicotine   • Devices: Pre-filled pod   Substance Use Topics   • Alcohol use: Never   • Drug use: Defer     Medications Prior to Admission   Medication Sig Dispense Refill Last Dose   • escitalopram (LEXAPRO) 20 MG tablet    5/10/2022 at Unknown time   • QUEtiapine (SEROquel) 50 MG tablet 150 mg Every Night.   5/10/2022 at Unknown time   • rosuvastatin (CRESTOR) 10 MG tablet TAKE 1 TABLET EVERY DAY (Patient taking differently: Every Night. TAKE 1 TABLET EVERY NIGHT) 90 tablet 4 5/10/2022 at Unknown time   • Accu-Chek Softclix Lancets lancets Use to test  blood sugars up to 6 times daily. DX:E10.65 550 each 3    • betamethasone dipropionate (DIPROLENE) 0.05 % lotion APPLY TOPICALLY TO THE SCALP EVERY DAY      • Blood Glucose Monitoring Suppl (Accu-Chek Guide) w/Device kit USE AS DIRECTED 1 kit 0    • clindamycin (CLEOCIN) 300 MG capsule       • GlucaGen HypoKit 1 MG injection INJECT AS DIRECTED BY YOUR PHYSICIAN 1 each 2    • glucose blood (Accu-Chek Guide) test strip Use to test blood sugars up to 6 times daily. DX:E10.65 550 each 3    • guaiFENesin-codeine (ROMILAR-AC) 100-10 MG/5ML syrup       • HYDROcodone-acetaminophen (NORCO) 7.5-325 MG per tablet TAKE 1 TABLET BY MOUTH EVERY 4 HOURS FOR 2 DAYS      • insulin aspart (NovoLOG) 100 UNIT/ML injection USE SUBCUTANEOUSLY IN PUMP ICR 12 AM 1:12; 8 AM 1:8; 5 PM 1:9. MAX DAILY DOSE 120 UNITS (DISCARD VIAL 28 DAYS AFTER OPEN) 110 mL 3    • Insulin Glargine (Lantus SoloStar) 100 UNIT/ML injection pen Inject 40 units sq daily. 5 pen 3    • Insulin Pen Needle (B-D UF III MINI PEN NEEDLES) 31G X 5 MM misc BD PEN NEEDLE MINI U/F 31G X 5 MM      • lisinopril (PRINIVIL,ZESTRIL) 2.5 MG tablet TAKE 1 TABLET EVERY DAY 90 tablet 3    • methocarbamol (ROBAXIN) 750 MG tablet Take 750 mg by mouth 4 (Four) Times a Day As Needed.      • methylPREDNISolone (MEDROL) 4 MG dose pack FOLLOW PACKAGE DIRECTIONS TAKE WITH MEAL      • metoclopramide (REGLAN) 5 MG tablet TAKE 1 TABLET BY MOUTH THREE TIMES DAILY WITH MEALS FOR 15 DAYS      • naproxen (NAPROSYN) 500 MG tablet Take 500 mg by mouth 2 (Two) Times a Day.      • ondansetron ODT (ZOFRAN-ODT) 4 MG disintegrating tablet DISSOLVE 1 TABLET ON THE TONGUE THREE TIMES DAILY AS NEEDED      • pantoprazole (PROTONIX) 40 MG EC tablet Take 40 mg by mouth Every Morning.      • polyethylene glycol (MIRALAX) 17 GM/SCOOP powder DISSOLVE 17 GRAMS IN LIQUID AND DRINK BY MOUTH TWICE DAILY      • promethazine-dextromethorphan (PROMETHAZINE-DM) 6.25-15 MG/5ML syrup TAKE 5 ML BY MOUTH FOUR TIMES DAILY AS  NEEDED FOR COUGH      • Scopolamine 1 MG/3DAYS patch       • SUMAtriptan (IMITREX) 100 MG tablet    Unknown at Unknown time   • topiramate (TOPAMAX) 50 MG tablet         Allergies:  Azithromycin, Penicillins, Seasonal ic  [cholestatin], Erythromycin base, Latex, and Sulfa antibiotics    Immunization History   Administered Date(s) Administered   • COVID-19 (PFIZER) PURPLE CAP 03/20/2021, 04/10/2021   • Flu Vaccine Intradermal Quad 18-64YR 09/25/2018, 10/02/2019   • Flu Vaccine Split Quad 09/25/2018, 10/02/2019, 11/25/2020   • FluLaval/Fluarix/Fluzone >6 12/31/2015, 11/21/2017, 09/25/2018, 10/02/2019, 11/25/2020   • Influenza TIV (IM) 09/30/2014, 10/31/2015, 10/07/2016, 11/21/2017   • Pneumococcal Polysaccharide (PPSV23) 01/01/2011, 11/30/2011   • Tdap 11/14/2018           REVIEW OF SYSTEMS:    Review of Systems   Constitutional: Negative.   HENT: Negative.    Eyes: Negative.    Cardiovascular: Positive for palpitations.   Respiratory: Negative.    Endocrine: Negative.    Hematologic/Lymphatic: Negative.    Skin: Negative.    Gastrointestinal: Negative.    Genitourinary: Negative.    Neurological: Negative.    Psychiatric/Behavioral: Negative.        Vital Signs  Temp:  [97.6 °F (36.4 °C)-98.1 °F (36.7 °C)] 97.7 °F (36.5 °C)  Heart Rate:  [] 100  Resp:  [13-18] 16  BP: (108-137)/(74-87) 125/81          Physical Exam:  Physical Exam  Vitals and nursing note reviewed.   Constitutional:       Appearance: Normal appearance.   HENT:      Head: Normocephalic and atraumatic.      Right Ear: External ear normal.      Left Ear: External ear normal.      Nose: Nose normal.      Mouth/Throat:      Mouth: Mucous membranes are moist.   Eyes:      Extraocular Movements: Extraocular movements intact.      Conjunctiva/sclera: Conjunctivae normal.      Pupils: Pupils are equal, round, and reactive to light.   Cardiovascular:      Rate and Rhythm: Normal rate and regular rhythm.      Pulses: Normal pulses.      Heart sounds:  Normal heart sounds.   Pulmonary:      Effort: Pulmonary effort is normal.      Breath sounds: Normal breath sounds.   Abdominal:      General: Bowel sounds are normal.      Palpations: Abdomen is soft.   Musculoskeletal:         General: Normal range of motion.      Cervical back: Normal range of motion and neck supple.   Skin:     General: Skin is warm.      Capillary Refill: Capillary refill takes less than 2 seconds.   Neurological:      General: No focal deficit present.      Mental Status: She is alert and oriented to person, place, and time. Mental status is at baseline.   Psychiatric:         Mood and Affect: Mood normal.         Behavior: Behavior normal.         Thought Content: Thought content normal.         Judgment: Judgment normal.         Emotional Behavior:    WNL   Debilities:   none   Results Review:    I reviewed the patient's new clinical results.  Lab Results (most recent)     Procedure Component Value Units Date/Time    POC Glucose Once [[id number]]  (Abnormal) Collected: 05/12/22 0709    Specimen: Blood Updated: 05/12/22 0710     Glucose 247 mg/dL      Comment: Serial Number: [id number]:  [id number]       Basic Metabolic Panel [[id number]]  (Abnormal) Collected: 05/12/22 0102    Specimen: Blood Updated: 05/12/22 0238     Glucose 204 mg/dL      BUN 15 mg/dL      Creatinine 0.89 mg/dL      Sodium 137 mmol/L      Potassium 4.2 mmol/L      Comment: Slight hemolysis detected by analyzer. Results may be affected.        Chloride 101 mmol/L      CO2 22.0 mmol/L      Calcium 9.1 mg/dL      BUN/Creatinine Ratio 16.9     Anion Gap 14.0 mmol/L      eGFR 80.6 mL/min/1.73      Comment: National Kidney Foundation and American Society of Nephrology (ASN) Task Force recommended calculation based on the Chronic Kidney Disease Epidemiology Collaboration (CKD-EPI) equation refit without adjustment for race.       Narrative:      GFR Normal >60  Chronic Kidney Disease <60  Kidney Failure <15      Magnesium  [[id number]]  (Normal) Collected: 05/12/22 0102    Specimen: Blood Updated: 05/12/22 0238     Magnesium 1.8 mg/dL     CBC & Differential [[id number]]  (Abnormal) Collected: 05/12/22 0102    Specimen: Blood Updated: 05/12/22 0226    Narrative:      The following orders were created for panel order CBC & Differential.  Procedure                               Abnormality         Status                     ---------                               -----------         ------                     CBC Auto Differential[[id number]]        Abnormal            Final result                 Please view results for these tests on the individual orders.    CBC Auto Differential [[id number]]  (Abnormal) Collected: 05/12/22 0102    Specimen: Blood Updated: 05/12/22 0226     WBC 7.50 10*3/mm3      RBC 4.47 10*6/mm3      Hemoglobin 12.9 g/dL      Hematocrit 37.7 %      MCV 84.3 fL      MCH 28.9 pg      MCHC 34.3 g/dL      RDW 14.3 %      RDW-SD 42.4 fl      MPV 7.7 fL      Platelets 284 10*3/mm3      Neutrophil % 64.6 %      Lymphocyte % 26.8 %      Monocyte % 4.5 %      Eosinophil % 3.3 %      Basophil % 0.8 %      Neutrophils, Absolute 4.90 10*3/mm3      Lymphocytes, Absolute 2.00 10*3/mm3      Monocytes, Absolute 0.30 10*3/mm3      Eosinophils, Absolute 0.20 10*3/mm3      Basophils, Absolute 0.10 10*3/mm3      nRBC 0.1 /100 WBC     POC Glucose Once [[id number]]  (Abnormal) Collected: 05/11/22 2028    Specimen: Blood Updated: 05/11/22 2030     Glucose 68 mg/dL      Comment: Serial Number: [id number]:  [id number]       Lipid Panel [[id number]]  (Abnormal) Collected: 05/11/22 1724    Specimen: Blood Updated: 05/11/22 1810     Total Cholesterol 242 mg/dL      Triglycerides 176 mg/dL      HDL Cholesterol 63 mg/dL      LDL Cholesterol  148 mg/dL      VLDL Cholesterol 31 mg/dL      LDL/HDL Ratio 2.28    Narrative:      Cholesterol Reference Ranges  (U.S. Department of Health and Human Services ATP III Classifications)    Desirable           <200 mg/dL  Borderline High    200-239 mg/dL  High Risk          >240 mg/dL      Triglyceride Reference Ranges  (U.S. Department of Health and Human Services ATP III Classifications)    Normal           <150 mg/dL  Borderline High  150-199 mg/dL  High             200-499 mg/dL  Very High        >500 mg/dL    HDL Reference Ranges  (U.S. Department of Health and Human Services ATP III Classifications)    Low     <40 mg/dl (major risk factor for CHD)  High    >60 mg/dl ('negative' risk factor for CHD)        LDL Reference Ranges  (U.S. Department of Health and Human Services ATP III Classifications)    Optimal          <100 mg/dL  Near Optimal     100-129 mg/dL  Borderline High  130-159 mg/dL  High             160-189 mg/dL  Very High        >189 mg/dL    Troponin [[id number]]  (Normal) Collected: 05/11/22 1724    Specimen: Blood Updated: 05/11/22 1810     Troponin T <0.010 ng/mL     Narrative:      Troponin T Reference Range:  <= 0.03 ng/mL-   Negative for AMI  >0.03 ng/mL-     Abnormal for myocardial necrosis.  Clinicians would have to utilize clinical acumen, EKG, Troponin and serial changes to determine if it is an Acute Myocardial Infarction or myocardial injury due to an underlying chronic condition.       Results may be falsely decreased if patient taking Biotin.      COVID PRE-OP / PRE-PROCEDURE SCREENING ORDER (NO ISOLATION) - Swab, Nasopharynx [[id number]]  (Normal) Collected: 05/11/22 1348    Specimen: Swab from Nasopharynx Updated: 05/11/22 1430    Narrative:      The following orders were created for panel order COVID PRE-OP / PRE-PROCEDURE SCREENING ORDER (NO ISOLATION) - Swab, Nasopharynx.  Procedure                               Abnormality         Status                     ---------                               -----------         ------                     COVID-19,CEPHEID/NEVA,CO...[[id number]]  Normal              Final result                 Please view results for these tests on the individual  orders.    COVID-19,CEPHEID/NEVA,COR/APOLLO/PAD/KAITLYN IN-HOUSE(OR EMERGENT/ADD-ON),NP SWAB IN TRANSPORT MEDIA 3-4 HR TAT, RT-PCR - Swab, Nasopharynx [[id number]]  (Normal) Collected: 05/11/22 1348    Specimen: Swab from Nasopharynx Updated: 05/11/22 1430     COVID19 Not Detected    Narrative:      Fact sheet for providers: https://www.fda.gov/media/203648/download     Fact sheet for patients: https://www.fda.gov/media/915245/download  Fact sheet for providers: https://www.fda.gov/media/200564/download     Fact sheet for patients: https://www.fda.gov/media/404031/download    Extra Tubes [[id number]] Collected: 05/11/22 1038    Specimen: Blood from Arm, Left Updated: 05/11/22 1149    Narrative:      The following orders were created for panel order Extra Tubes.  Procedure                               Abnormality         Status                     ---------                               -----------         ------                     Gold Top - SST[[id number]]                                   Final result               Green Top (Gel)[[id number]]                                                               Please view results for these tests on the individual orders.    Gold Top - SST [[id number]] Collected: 05/11/22 1038    Specimen: Blood from Arm, Left Updated: 05/11/22 1149     Extra Tube Hold for add-ons.     Comment: Auto resulted.       Comprehensive Metabolic Panel [[id number]]  (Abnormal) Collected: 05/11/22 1038    Specimen: Blood from Arm, Left Updated: 05/11/22 1125     Glucose 315 mg/dL      BUN 14 mg/dL      Creatinine 0.77 mg/dL      Sodium 136 mmol/L      Potassium 3.8 mmol/L      Chloride 100 mmol/L      CO2 21.0 mmol/L      Calcium 9.5 mg/dL      Total Protein 7.3 g/dL      Albumin 4.00 g/dL      ALT (SGPT) 42 U/L      AST (SGOT) 23 U/L      Alkaline Phosphatase 116 U/L      Total Bilirubin 0.2 mg/dL      Globulin 3.3 gm/dL      A/G Ratio 1.2 g/dL      BUN/Creatinine Ratio 18.2     Anion Gap 15.0 mmol/L       eGFR 95.9 mL/min/1.73      Comment: National Kidney Foundation and American Society of Nephrology (ASN) Task Force recommended calculation based on the Chronic Kidney Disease Epidemiology Collaboration (CKD-EPI) equation refit without adjustment for race.       Narrative:      GFR Normal >60  Chronic Kidney Disease <60  Kidney Failure <15      Troponin [[id number]]  (Normal) Collected: 05/11/22 1038    Specimen: Blood from Arm, Left Updated: 05/11/22 1125     Troponin T <0.010 ng/mL     Narrative:      Troponin T Reference Range:  <= 0.03 ng/mL-   Negative for AMI  >0.03 ng/mL-     Abnormal for myocardial necrosis.  Clinicians would have to utilize clinical acumen, EKG, Troponin and serial changes to determine if it is an Acute Myocardial Infarction or myocardial injury due to an underlying chronic condition.       Results may be falsely decreased if patient taking Biotin.      D-dimer, Quantitative [[id number]]  (Normal) Collected: 05/11/22 1038    Specimen: Blood from Arm, Left Updated: 05/11/22 1102     D-Dimer, Quantitative 0.49 mg/L (FEU)     Narrative:      Reference Range  --------------------------------------------------------------------     < 0.50   Negative Predictive Value  0.50-0.59   Indeterminate    >= 0.60   Probable VTE             A very low percentage of patients with DVT may yield D-Dimer results   below the cut-off of 0.50 mg/L FEU.  This is known to be more   prevalent in patients with distal DVT.             Results of this test should always be interpreted in conjunction with   the patient's medical history, clinical presentation and other   findings.  Clinical diagnosis should not be based on the result of   [place] D-Dimer alone.    CBC & Differential [[id number]]  (Abnormal) Collected: 05/11/22 1038    Specimen: Blood from Arm, Left Updated: 05/11/22 1051    Narrative:      The following orders were created for panel order CBC & Differential.  Procedure                                Abnormality         Status                     ---------                               -----------         ------                     CBC Auto Differential[[id number]]        Abnormal            Final result                 Please view results for these tests on the individual orders.    CBC Auto Differential [[id number]]  (Abnormal) Collected: 05/11/22 1038    Specimen: Blood from Arm, Left Updated: 05/11/22 1051     WBC 7.30 10*3/mm3      RBC 4.63 10*6/mm3      Hemoglobin 13.4 g/dL      Hematocrit 39.1 %      MCV 84.4 fL      MCH 28.9 pg      MCHC 34.3 g/dL      RDW 14.1 %      RDW-SD 42.4 fl      MPV 7.4 fL      Platelets 305 10*3/mm3      Neutrophil % 74.3 %      Lymphocyte % 18.0 %      Monocyte % 4.6 %      Eosinophil % 2.4 %      Basophil % 0.7 %      Neutrophils, Absolute 5.40 10*3/mm3      Lymphocytes, Absolute 1.30 10*3/mm3      Monocytes, Absolute 0.30 10*3/mm3      Eosinophils, Absolute 0.20 10*3/mm3      Basophils, Absolute 0.00 10*3/mm3      nRBC 0.0 /100 WBC           Imaging Results (Most Recent)     Procedure Component Value Units Date/Time    XR Chest 1 View [[id number]] Collected: 05/11/22 1035     Updated: 05/11/22 1037    Narrative:      DATE OF EXAM:  5/11/2022 10:27 AM     PROCEDURE:  XR CHEST 1 VW-     INDICATIONS:  chest pain       COMPARISON:  AP portable chest 12/14/2016     TECHNIQUE:   Single radiographic view of the chest was obtained.     FINDINGS:  No acute airspace disease. Normal heart size. No pleural effusion or  pneumothorax. No acute osseous abnormality. Surgical change of the  cervical spine.       Impression:      No acute chest finding.     Electronically Signed By-Bridgett Brasher MD On:5/11/2022 10:35 AM  This report was finalized on [id number] by  Bridgett Brasher MD.        reviewed    ECG/EMG Results (most recent)     Procedure Component Value Units Date/Time    ECG 12 Lead [[id number]] Collected: 05/11/22 1006     Updated: 05/11/22 1009     QT Interval 339 ms     Narrative:       HEART RATE= 98  bpm  RR Interval= 608  ms  SC Interval= 132  ms  P Horizontal Axis= 16  deg  P Front Axis= 43  deg  QRSD Interval= 88  ms  QT Interval= 339  ms  QRS Axis= 86  deg  T Wave Axis= 46  deg  - NORMAL ECG -  Sinus rhythm  When compared with ECG of 26-Nov-2016 14:43:44,  No significant change  Electronically Signed By:   Date and Time of Study: 2022-05-11 10:06:50        reviewed            Microbiology Results (last 10 days)     Procedure Component Value - Date/Time    COVID PRE-OP / PRE-PROCEDURE SCREENING ORDER (NO ISOLATION) - Swab, Nasopharynx [[id number]]  (Normal) Collected: 05/11/22 1348    Lab Status: Final result Specimen: Swab from Nasopharynx Updated: 05/11/22 1430    Narrative:      The following orders were created for panel order COVID PRE-OP / PRE-PROCEDURE SCREENING ORDER (NO ISOLATION) - Swab, Nasopharynx.  Procedure                               Abnormality         Status                     ---------                               -----------         ------                     COVID-19,CEPHEID/NEVA,CO...[[id number]]  Normal              Final result                 Please view results for these tests on the individual orders.    COVID-19,CEPHEID/NEVA,COR/APOLLO/PAD/KAITLYN IN-HOUSE(OR EMERGENT/ADD-ON),NP SWAB IN TRANSPORT MEDIA 3-4 HR TAT, RT-PCR - Swab, Nasopharynx [[id number]]  (Normal) Collected: 05/11/22 1348    Lab Status: Final result Specimen: Swab from Nasopharynx Updated: 05/11/22 1430     COVID19 Not Detected    Narrative:      Fact sheet for providers: https://www.fda.gov/media/536154/download     Fact sheet for patients: https://www.fda.gov/media/475463/download  Fact sheet for providers: https://www.fda.gov/media/104925/download     Fact sheet for patients: https://www.fda.gov/media/984177/download          Assessment & Plan     Chest pain     1. Chest Pain  - EKG normal sinus  - Chest x-ray no acute chest findings.   - Troponin 0.010  - Stress test low risk study    2. Chronic,  controlled diabetes mellitus  - Hold PO home medications  - Sliding scale insulin  - Patient to manage home insulin pump. Contract signed.     I discussed the patients findings and my recommendations with patient.     Discharge Diagnosis:      Chest pain      Hospital Course  Patient is a 46 y.o. female presented with chest pain. Patient reported chest pain for two days with pain that waxes and wanes for about 15 minutes. Reported dyspnea, diaphoresis, nausea but without vomiting, diarrhea, fever, chills, or edema. Report increased fatigue but none today. Denies chest pain today 1/10. Denied wanting nitroglycerin. Troponins negative at 0.010, Bun 15, cr 0.89, WBC 7.5, Total cholesterol 242, HDL 63, . EKG normal sinus. Chest x-ray no acute chest finding. Stress test showed normal ECG stress test with left ventricular fraction of 70% with low risk study. Follow-up with PCP and cardiology in 2 weeks for management of dyspnea and chest pain. Call 911 or go to nearest ER for chest pain or dyspnea.       Past Medical History:     Past Medical History:   Diagnosis Date   • DKA (diabetic ketoacidoses) (CMS/HCC)        Past Surgical History:     Past Surgical History:   Procedure Laterality Date   • HIP SURGERY  05/20/2020       Social History:   Social History     Socioeconomic History   • Marital status:    Tobacco Use   • Smoking status: Former Smoker     Types: Electronic Cigarette   • Smokeless tobacco: Never Used   Vaping Use   • Vaping Use: Some days   • Substances: Nicotine   • Devices: Pre-filled pod   Substance and Sexual Activity   • Alcohol use: Never   • Drug use: Defer   • Sexual activity: Defer       Procedures Performed         Consults:   Consults     No orders found for last 30 day(s).          Condition on Discharge:     Stable    Discharge Disposition      Discharge Medications     Discharge Medications      Changes to Medications      Instructions Start Date   rosuvastatin 10 MG  tablet  Commonly known as: CRESTOR  What changed:   · how much to take  · how to take this  · when to take this  · additional instructions   TAKE 1 TABLET EVERY DAY         Continue These Medications      Instructions Start Date   Accu-Chek Guide test strip  Generic drug: glucose blood   Use to test blood sugars up to 6 times daily. DX:E10.65      Accu-Chek Guide w/Device kit   USE AS DIRECTED      Accu-Chek Softclix Lancets lancets   Use to test blood sugars up to 6 times daily. DX:E10.65      B-D UF III MINI PEN NEEDLES 31G X 5 MM misc  Generic drug: Insulin Pen Needle   BD PEN NEEDLE MINI U/F 31G X 5 MM      betamethasone dipropionate 0.05 % lotion  Commonly known as: DIPROLENE   APPLY TOPICALLY TO THE SCALP EVERY DAY      clindamycin 300 MG capsule  Commonly known as: CLEOCIN   No dose, route, or frequency recorded.      escitalopram 20 MG tablet  Commonly known as: LEXAPRO   No dose, route, or frequency recorded.      GlucaGen HypoKit 1 MG injection  Generic drug: glucagon   INJECT AS DIRECTED BY YOUR PHYSICIAN      guaiFENesin-codeine 100-10 MG/5ML syrup  Commonly known as: ROMILAR-AC   No dose, route, or frequency recorded.      HYDROcodone-acetaminophen 7.5-325 MG per tablet  Commonly known as: NORCO   TAKE 1 TABLET BY MOUTH EVERY 4 HOURS FOR 2 DAYS      insulin aspart 100 UNIT/ML injection  Commonly known as: NovoLOG   USE SUBCUTANEOUSLY IN PUMP ICR 12 AM 1:12; 8 AM 1:8; 5 PM 1:9. MAX DAILY DOSE 120 UNITS (DISCARD VIAL 28 DAYS AFTER OPEN)      Lantus SoloStar 100 UNIT/ML injection pen  Generic drug: Insulin Glargine   Inject 40 units sq daily.      lisinopril 2.5 MG tablet  Commonly known as: PRINIVIL,ZESTRIL   TAKE 1 TABLET EVERY DAY      methocarbamol 750 MG tablet  Commonly known as: ROBAXIN   750 mg, Oral, 4 Times Daily PRN      methylPREDNISolone 4 MG dose pack  Commonly known as: MEDROL   FOLLOW PACKAGE DIRECTIONS TAKE WITH MEAL      metoclopramide 5 MG tablet  Commonly known as: REGLAN   TAKE 1  TABLET BY MOUTH THREE TIMES DAILY WITH MEALS FOR 15 DAYS      naproxen 500 MG tablet  Commonly known as: NAPROSYN   500 mg, Oral, 2 Times Daily      ondansetron ODT 4 MG disintegrating tablet  Commonly known as: ZOFRAN-ODT   DISSOLVE 1 TABLET ON THE TONGUE THREE TIMES DAILY AS NEEDED      pantoprazole 40 MG EC tablet  Commonly known as: PROTONIX   40 mg, Oral, Every Morning      polyethylene glycol 17 GM/SCOOP powder  Commonly known as: MIRALAX   DISSOLVE 17 GRAMS IN LIQUID AND DRINK BY MOUTH TWICE DAILY      promethazine-dextromethorphan 6.25-15 MG/5ML syrup  Commonly known as: PROMETHAZINE-DM   TAKE 5 ML BY MOUTH FOUR TIMES DAILY AS NEEDED FOR COUGH      QUEtiapine 50 MG tablet  Commonly known as: SEROquel   150 mg, Nightly      Scopolamine 1 MG/3DAYS patch   No dose, route, or frequency recorded.      SUMAtriptan 100 MG tablet  Commonly known as: IMITREX   No dose, route, or frequency recorded.      topiramate 50 MG tablet  Commonly known as: TOPAMAX   No dose, route, or frequency recorded.             Discharge Diet:   Diet Instructions     Diet: Consistent Carbohydrate, Cardiac      Discharge Diet:  Consistent Carbohydrate  Cardiac             Activity at Discharge:   Activity Instructions     Discharge Activity            Follow-up Appointments  Future Appointments   Date Time Provider Department Center   7/11/2022  2:15 PM Tawanda Lorenz MD [facility]     Additional Instructions for the Follow-ups that You Need to Schedule     Discharge Follow-up with PCP   As directed       Currently Documented PCP:    Manda Raines APRN    PCP Phone Number:    [phone]     Follow Up Details: 1 week         Discharge Follow-up with Specialty: Cardiologist; 2 Weeks   As directed      Specialty: Cardiologist    Follow Up: 2 Weeks               Test Results Pending at Discharge       Risk for Readmission (LACE) Score: 4 (5/12/2022  6:01 AM)          SKYLER Johnson  05/12/22  11:06 EDT

## 2022-05-12 NOTE — PLAN OF CARE
Goal Outcome Evaluation:   Pt received stress test this morning, Pt to be D/C home today.   Problem: Adult Inpatient Plan of Care  Goal: Plan of Care Review  Outcome: Ongoing, Progressing  Goal: Patient-Specific Goal (Individualized)  Outcome: Ongoing, Progressing  Goal: Absence of Hospital-Acquired Illness or Injury  Outcome: Ongoing, Progressing  Intervention: Identify and Manage Fall Risk  Intervention: Prevent Infection  Goal: Optimal Comfort and Wellbeing  Outcome: Ongoing, Progressing  Intervention: Provide Person-Centered Care  Goal: Readiness for Transition of Care  Outcome: Ongoing, Progressing     Problem: Chest Pain  Goal: Resolution of Chest Pain Symptoms  Outcome: Ongoing, Progressing                  Yes

## 2022-05-17 LAB — QT INTERVAL: 339 MS

## 2022-05-17 RX ORDER — GLUCAGON HYDROCHLORIDE 1 MG
KIT INJECTION
Qty: 1 EACH | Refills: 3 | Status: SHIPPED | OUTPATIENT
Start: 2022-05-17 | End: 2022-09-12

## 2022-06-13 ENCOUNTER — TELEPHONE (OUTPATIENT)
Dept: ENDOCRINOLOGY | Facility: CLINIC | Age: 47
End: 2022-06-13

## 2022-06-20 RX ORDER — LISINOPRIL 2.5 MG/1
TABLET ORAL
Qty: 90 TABLET | Refills: 3 | Status: SHIPPED | OUTPATIENT
Start: 2022-06-20

## 2022-07-11 ENCOUNTER — TELEMEDICINE (OUTPATIENT)
Dept: ENDOCRINOLOGY | Facility: CLINIC | Age: 47
End: 2022-07-11

## 2022-07-11 ENCOUNTER — TELEPHONE (OUTPATIENT)
Dept: ENDOCRINOLOGY | Facility: CLINIC | Age: 47
End: 2022-07-11

## 2022-07-11 VITALS
SYSTOLIC BLOOD PRESSURE: 118 MMHG | BODY MASS INDEX: 36.8 KG/M2 | WEIGHT: 200 LBS | HEIGHT: 62 IN | DIASTOLIC BLOOD PRESSURE: 83 MMHG

## 2022-07-11 DIAGNOSIS — E10.65 TYPE 1 DIABETES MELLITUS WITH HYPERGLYCEMIA: Primary | ICD-10-CM

## 2022-07-11 DIAGNOSIS — E78.2 MIXED HYPERLIPIDEMIA: ICD-10-CM

## 2022-07-11 PROBLEM — E66.812 CLASS 2 DRUG-INDUCED OBESITY WITH BODY MASS INDEX (BMI) OF 36.0 TO 36.9 IN ADULT: Status: ACTIVE | Noted: 2022-03-01

## 2022-07-11 PROBLEM — E66.1 CLASS 2 DRUG-INDUCED OBESITY WITH BODY MASS INDEX (BMI) OF 36.0 TO 36.9 IN ADULT: Status: ACTIVE | Noted: 2022-03-01

## 2022-07-11 PROCEDURE — 99214 OFFICE O/P EST MOD 30 MIN: CPT | Performed by: INTERNAL MEDICINE

## 2022-07-11 RX ORDER — METRONIDAZOLE 10 MG/G
GEL TOPICAL
COMMUNITY
Start: 2022-06-28

## 2022-07-11 RX ORDER — CYCLOBENZAPRINE HCL 10 MG
5-10 TABLET ORAL
COMMUNITY
Start: 2022-06-24 | End: 2022-07-11

## 2022-07-11 RX ORDER — PREDNISONE 20 MG/1
20 TABLET ORAL 2 TIMES DAILY
COMMUNITY
Start: 2022-06-23 | End: 2022-07-11

## 2022-07-11 RX ORDER — CYCLOBENZAPRINE HCL 10 MG
TABLET ORAL
COMMUNITY
Start: 2022-06-24

## 2022-07-11 RX ORDER — ALBUTEROL SULFATE 90 UG/1
2 AEROSOL, METERED RESPIRATORY (INHALATION)
COMMUNITY
Start: 2022-06-19

## 2022-07-11 RX ORDER — PANTOPRAZOLE SODIUM 20 MG/1
TABLET, DELAYED RELEASE ORAL
COMMUNITY
Start: 2022-04-22 | End: 2022-07-11

## 2022-07-11 RX ORDER — TRAMADOL HYDROCHLORIDE 50 MG/1
50 TABLET ORAL EVERY 6 HOURS PRN
COMMUNITY
Start: 2022-04-22 | End: 2022-07-11

## 2022-07-11 RX ORDER — IMIQUIMOD 12.5 MG/.25G
CREAM TOPICAL
COMMUNITY
Start: 2022-06-28

## 2022-07-11 NOTE — TELEPHONE ENCOUNTER
Pt is currently using a longer 670G pump but has the 770G to transfer to, she just has not had the chance to yet. Once she is on 770 and using automode, she will reach out to educators to review [facility] reports.

## 2022-07-11 NOTE — PATIENT INSTRUCTIONS
Continue current management  Always keep glucose source in case of low blood sugar  Annual eye exam and flu vaccine  Labs before follow-up

## 2022-07-11 NOTE — PROGRESS NOTES
Endocrine Progress Note Outpatient     Patient Care Team:  Manda Raines APRN as PCP - General (Nurse Practitioner)  Sage Cobos MD as Consulting Physician (Cardiology)  You have chosen to receive care through a telehealth visit.  Do you consent to use a video/audio connection for your medical care today? Yes      Chief Complaint: Follow-up type 1 diabetes: Conducted through [facility]      HPI: 46-year-old female with history of type 1 diabetes and hyperlipidemia is followed through telehealth.    For type 1 diabetes she is currently on Medtronic 770 G system insulin pump.  Her current insulin pump settings are as follows: Basal rates: Midnight 1.50 units/h, 6 AM 1.70 units/h, 8 AM 1.95, 10 PM 1.50 units/h.  Insulin carb ratio is 1 unit for each 3 g of carbohydrate.  Insulin sensitive factor is 1 unit for each 20 with a target blood sugar of 140.  She denies any significant low blood sugars.  She does have a CGM S from the [facility] and has been using auto mode.  Denies any significant issues with low blood sugars.  Has not required any hospitalization or emergency room visits since last seen for low or high blood sugars.    Hyperlipidemia: She is currently on Crestor.  She has not been taking her cholesterol-lowering medications on regular basis.      Diabetic microvascular complications: None    She is on lisinopril for renal protection.    Past Medical History:   Diagnosis Date   • DKA (diabetic ketoacidoses)    • Skin cancer        Social History     Socioeconomic History   • Marital status:      Spouse name: Markus   • Number of children: 2   • Years of education: 14   Tobacco Use   • Smoking status: Former Smoker     Types: Electronic Cigarette   • Smokeless tobacco: Never Used   Vaping Use   • Vaping Use: Some days   • Substances: Flavoring   • Devices: Pre-filled pod   Substance and Sexual Activity   • Alcohol use: Never   • Drug use: Defer   • Sexual activity: Defer       Family History  "  Problem Relation Age of Onset   • Cancer Mother         rectal / colon / skin   • Heart disease Father    • Hypertension Father    • Post-traumatic stress disorder Father        Allergies   Allergen Reactions   • Azithromycin Other (See Comments)     rash  rash     • Penicillins Rash     severe rash     • Seasonal Ic  [Cholestatin] Itching     Other reaction(s): Erythema (finding)  Adhesives; \"Paper Tape is OK\" - per pt   • Erythromycin Base Rash     childhood reaction   • Latex Rash   • Sulfa Antibiotics Rash     rash         ROS:   Constitutional:  Denies fatigue, tiredness.    Eyes:  Denies change in visual acuity   HENT:  Denies nasal congestion or sore throat   Respiratory: denies cough, shortness of breath.   Cardiovascular:  denies chest pain, edema   GI:  Denies abdominal pain, nausea, vomiting.   Musculoskeletal:  Denies back pain or joint pain   Integument:  Denies dry skin and rash   Neurologic:  Denies headache, focal weakness or sensory changes   Endocrine:  Denies polyuria or polydipsia   Psychiatric:  Denies depression or anxiety      Vitals:    07/11/22 1404   BP: 118/83       Physical Exam:  GEN: NAD, conversant  PSYCH: AOX3, appropriate mood, affect normal      Results Review:     I reviewed the patient's new clinical results.    Lab Results   Component Value Date    HGBA1C 9.3 (H) 05/23/2022    HGBA1C 8.0 (H) 05/12/2021    HGBA1C 8.7 (H) 03/02/2021      Labs reviewed: Labs from July 8, 2022 showed TSH 2.78, free T4 0.81  Sodium 137, potassium 3.6, chloride 104, CO2 23, glucose 361, BUN 13, creatinine 0.8, AST 25, ALT 50, A1c 8.8, , triglycerides 183, HDL 54 and urine microalbumin was 19.    Medication Review: Reviewed.       Current Outpatient Medications:   •  albuterol sulfate  (90 Base) MCG/ACT inhaler, Inhale 2 puffs., Disp: , Rfl:   •  Blood Glucose Monitoring Suppl (Accu-Chek Guide) w/Device kit, USE AS DIRECTED, Disp: 1 kit, Rfl: 0  •  cyclobenzaprine (FLEXERIL) 10 MG " tablet, TAKE 1/2 TO 1 TABLET BY MOUTH TWICE DAILY AS NEEDED FOR MUSCLE SPASMS, Disp: , Rfl:   •  escitalopram (LEXAPRO) 20 MG tablet, , Disp: , Rfl:   •  GlucaGen HypoKit 1 MG injection, INJECT 1MG IN THE APPROPRIATE MUSCLE AS DIRECTED ONCE FOR 1 DOSE, Disp: 1 each, Rfl: 3  •  glucose blood (Accu-Chek Guide) test strip, Use to test blood sugars up to 6 times daily. DX:E10.65, Disp: 550 each, Rfl: 3  •  imiquimod (ALDARA) 5 % cream, APPLY TO RIGHT NOSE ON MONDAY AND THURSDAY AT NIGHT, Disp: , Rfl:   •  insulin aspart (NovoLOG) 100 UNIT/ML injection, USE SUBCUTANEOUSLY IN PUMP ICR 12 AM 1:12; 8 AM 1:8; 5 PM 1:9. MAX DAILY DOSE 120 UNITS (DISCARD VIAL 28 DAYS AFTER OPEN), Disp: 110 mL, Rfl: 3  •  Insulin Glargine (Lantus SoloStar) 100 UNIT/ML injection pen, Inject 40 units sq daily., Disp: 5 pen, Rfl: 3  •  Insulin Pen Needle 31G X 5 MM misc, BD PEN NEEDLE MINI U/F 31G X 5 MM, Disp: , Rfl:   •  lisinopril (PRINIVIL,ZESTRIL) 2.5 MG tablet, TAKE 1 TABLET EVERY DAY, Disp: 90 tablet, Rfl: 3  •  metroNIDAZOLE (METROGEL) 1 % gel, APPLY TO CENTRAL FACE TWICE DAILY, Disp: , Rfl:   •  ondansetron ODT (ZOFRAN-ODT) 4 MG disintegrating tablet, DISSOLVE 1 TABLET ON THE TONGUE THREE TIMES DAILY AS NEEDED, Disp: , Rfl:   •  polyethylene glycol (MIRALAX) 17 GM/SCOOP powder, DISSOLVE 17 GRAMS IN LIQUID AND DRINK BY MOUTH TWICE DAILY, Disp: , Rfl:   •  QUEtiapine (SEROquel) 50 MG tablet, 150 mg Every Night., Disp: , Rfl:   •  rosuvastatin (CRESTOR) 10 MG tablet, TAKE 1 TABLET EVERY DAY (Patient taking differently: Every Night. TAKE 1 TABLET EVERY NIGHT), Disp: 90 tablet, Rfl: 4  •  SUMAtriptan (IMITREX) 100 MG tablet, , Disp: , Rfl:       Assessment & Plan   1.  Diabetes mellitus type 1 with hyperglycemia: Uncontrolled with A1c at 8.8%.  She is setting her up new pump.  She will be sending the CGM S data for review.  In the meantime continue current insulin pump settings.  Advised to always keep glucose source with her in case of low  blood sugar and have Glucagon Emergency Kit available all the time.  Also advised to get annual eye exam and flu vaccine. Sick day rules d/w her.     2.  Hyperlipidemia: Uncontrolled, she has been missing her rosuvastatin which she is now taking from regular basis.    3.  Elevated blood pressure: Blood pressure well controlled.            Tawanda Lorenz MD FACE.    Much of the above report is an electronic transcription/translation of the spoken language to printed text using Dragon Software. As such, the subtleties and finesse of the spoken language may permit erroneous, or at times, nonsensical words or phrases to be inadvertently transcribed; thus changes may be made at a later date to rectify these errors.

## 2022-08-10 RX ORDER — INSULIN ASPART 100 [IU]/ML
INJECTION, SOLUTION INTRAVENOUS; SUBCUTANEOUS
Qty: 110 ML | Refills: 3 | Status: SHIPPED | OUTPATIENT
Start: 2022-08-10 | End: 2022-12-30 | Stop reason: SDUPTHER

## 2022-09-12 RX ORDER — GLUCAGON HYDROCHLORIDE 1 MG
KIT INJECTION
Qty: 1 EACH | Refills: 11 | Status: SHIPPED | OUTPATIENT
Start: 2022-09-12

## 2022-12-20 ENCOUNTER — HOSPITAL ENCOUNTER (OUTPATIENT)
Facility: HOSPITAL | Age: 47
Discharge: HOME | End: 2022-12-20
Attending: EMERGENCY MEDICINE
Payer: COMMERCIAL

## 2022-12-20 VITALS
HEIGHT: 62 IN | WEIGHT: 210 LBS | TEMPERATURE: 97.6 F | RESPIRATION RATE: 18 BRPM | HEART RATE: 104 BPM | BODY MASS INDEX: 38.64 KG/M2 | OXYGEN SATURATION: 97 % | SYSTOLIC BLOOD PRESSURE: 142 MMHG | DIASTOLIC BLOOD PRESSURE: 84 MMHG

## 2022-12-20 DIAGNOSIS — B34.9 NONSPECIFIC SYNDROME SUGGESTIVE OF VIRAL ILLNESS: Primary | ICD-10-CM

## 2022-12-20 LAB
FLUAV SUBTYP SPEC NAA+PROBE: NOT DETECTED
FLUBV RNA ISLT QL NAA+PROBE: NOT DETECTED
SARS-COV-2 RNA RESP QL NAA+PROBE: NOT DETECTED

## 2022-12-20 PROCEDURE — 87636 SARSCOV2 & INF A&B AMP PRB: CPT | Performed by: EMERGENCY MEDICINE

## 2022-12-20 PROCEDURE — EDLOS: Performed by: NURSE PRACTITIONER

## 2022-12-20 PROCEDURE — G0463 HOSPITAL OUTPT CLINIC VISIT: HCPCS | Performed by: NURSE PRACTITIONER

## 2022-12-20 PROCEDURE — 99203 OFFICE O/P NEW LOW 30 MIN: CPT | Performed by: NURSE PRACTITIONER

## 2022-12-20 NOTE — DISCHARGE INSTRUCTIONS
Follow-up with primary care for further evaluation and treatment    Make Sure you are drinking plenty of fluids    Tylenol/Motrin as needed for pain/fever

## 2022-12-20 NOTE — FSED PROVIDER NOTE
Subjective   History of Present Illness  The patient is a 47-year-old female presents to the ER with generalized body aches, nausea for the past 3 to 4 days. Daughter just tested positive for Flu A here.     History provided by:  Patient   used: No        Review of Systems   Gastrointestinal: Positive for nausea.   Musculoskeletal: Positive for myalgias.       Past Medical History:   Diagnosis Date   • DKA (diabetic ketoacidoses)    • Skin cancer        Allergies   Allergen Reactions   • Azithromycin Other (See Comments)     rash  rash     • Penicillins Rash     severe rash     • Seasonal Ic  [Cholestatin] Itching     Other reaction(s): Erythema (finding)  Adhesives; \"Paper Tape is OK\" - per pt   • Erythromycin Base Rash     childhood reaction   • Latex Rash   • Sulfa Antibiotics Rash     rash         Past Surgical History:   Procedure Laterality Date   • HIP SURGERY  05/20/2020       Family History   Problem Relation Age of Onset   • Cancer Mother         rectal / colon / skin   • Heart disease Father    • Hypertension Father    • Post-traumatic stress disorder Father        Social History     Socioeconomic History   • Marital status:      Spouse name: Markus   • Number of children: 2   • Years of education: 14   Tobacco Use   • Smoking status: Former     Types: Electronic Cigarette   • Smokeless tobacco: Never   Vaping Use   • Vaping Use: Some days   • Substances: Flavoring   • Devices: Pre-filled pod   Substance and Sexual Activity   • Alcohol use: Never   • Drug use: Defer   • Sexual activity: Defer           Objective   Physical Exam  Vitals and nursing note reviewed.   Constitutional:       Appearance: She is obese.   HENT:      Head: Normocephalic.      Right Ear: Tympanic membrane, ear canal and external ear normal.      Left Ear: Tympanic membrane, ear canal and external ear normal.      Nose: Nose normal.      Mouth/Throat:      Lips: Pink.      Mouth: Mucous membranes are moist.       Pharynx: Oropharynx is clear. Uvula midline.   Eyes:      Pupils: Pupils are equal, round, and reactive to light.   Cardiovascular:      Rate and Rhythm: Normal rate and regular rhythm.      Pulses: Normal pulses.      Heart sounds: Normal heart sounds.   Pulmonary:      Effort: Pulmonary effort is normal.      Breath sounds: Normal breath sounds.   Musculoskeletal:         General: Normal range of motion.   Skin:     General: Skin is warm and dry.   Neurological:      General: No focal deficit present.      Mental Status: She is alert.   Psychiatric:         Mood and Affect: Mood normal.         Behavior: Behavior is cooperative.         Procedures           ED Course                                           MDM  Number of Diagnoses or Management Options  Nonspecific syndrome suggestive of viral illness: new and requires workup  Diagnosis management comments: Patient with negative COVID/influenza swabs at this time. Patient in no acute distress on exam.        Amount and/or Complexity of Data Reviewed  Clinical lab tests: reviewed    Risk of Complications, Morbidity, and/or Mortality  Presenting problems: minimal  Diagnostic procedures: minimal  Management options: minimal    Patient Progress  Patient progress: stable      Final diagnoses:   Nonspecific syndrome suggestive of viral illness       ED Disposition  ED Disposition     ED Disposition   Discharge    Condition   Stable    Comment   --             Manda Raines, APRN  [address] [place] [address]  [phone]    In 1 week  If symptoms worsen, As needed         Medication List      Changed    rosuvastatin 10 MG tablet  Commonly known as: CRESTOR  TAKE 1 TABLET EVERY DAY  What changed:   · how much to take  · how to take this  · when to take this  · additional instructions

## 2022-12-21 RX ORDER — INSULIN GLARGINE 100 [IU]/ML
INJECTION, SOLUTION SUBCUTANEOUS
Qty: 45 ML | Refills: 2 | Status: SHIPPED | OUTPATIENT
Start: 2022-12-21

## 2022-12-27 NOTE — TELEPHONE ENCOUNTER
Looks like pt may use [employer] as her supplier. Emailed ELIGIO Toscano to see if ppw can be sent to educator to fill out.

## 2022-12-27 NOTE — TELEPHONE ENCOUNTER
Educator called [employer] and will send them most recent OV as requested. Pt is currently on a billing hold, and she has to call them regarding that in order to move forward with her orders. Phone # that will need to be given to pt for billing dept is [phone].

## 2022-12-27 NOTE — TELEPHONE ENCOUNTER
Attempted to call pt but had to LVM. According to her chart, back on 4/29/22 we did pump ppw for her but nothing since. Emailed WHIT Monterroso with [employer] to see if there is any paperwork that needs to be completed.

## 2022-12-30 ENCOUNTER — TELEPHONE (OUTPATIENT)
Dept: ENDOCRINOLOGY | Facility: CLINIC | Age: 47
End: 2022-12-30

## 2022-12-30 RX ORDER — INSULIN ASPART 100 [IU]/ML
INJECTION, SOLUTION INTRAVENOUS; SUBCUTANEOUS
Qty: 110 ML | Refills: 3 | Status: SHIPPED | OUTPATIENT
Start: 2022-12-30

## 2023-02-15 PROBLEM — Z51.81 MEDICATION MONITORING ENCOUNTER: Status: ACTIVE | Noted: 2022-10-26

## 2023-02-15 PROBLEM — Z96.649: Status: ACTIVE | Noted: 2022-10-26

## 2023-02-15 PROBLEM — Z79.899 CONTROLLED SUBSTANCE AGREEMENT SIGNED: Status: ACTIVE | Noted: 2022-10-26

## 2023-02-15 RX ORDER — TRAMADOL HYDROCHLORIDE 50 MG/1
TABLET ORAL
COMMUNITY
Start: 2022-11-22

## 2023-02-15 RX ORDER — BETAMETHASONE DIPROPIONATE 0.5 MG/G
LOTION TOPICAL
COMMUNITY
Start: 2023-01-11

## 2023-02-15 RX ORDER — METHYLPREDNISOLONE 4 MG/1
TABLET ORAL
COMMUNITY
Start: 2023-01-11

## 2023-04-10 RX ORDER — ROSUVASTATIN CALCIUM 10 MG/1
TABLET, COATED ORAL
Qty: 90 TABLET | Refills: 4 | OUTPATIENT
Start: 2023-04-10

## 2023-07-13 ENCOUNTER — OFFICE (AMBULATORY)
Dept: URBAN - METROPOLITAN AREA CLINIC 64 | Facility: CLINIC | Age: 48
End: 2023-07-13

## 2023-07-13 VITALS
WEIGHT: 206 LBS | DIASTOLIC BLOOD PRESSURE: 81 MMHG | HEIGHT: 62 IN | SYSTOLIC BLOOD PRESSURE: 130 MMHG | HEART RATE: 113 BPM

## 2023-07-13 DIAGNOSIS — R12 HEARTBURN: ICD-10-CM

## 2023-07-13 DIAGNOSIS — R14.0 ABDOMINAL DISTENSION (GASEOUS): ICD-10-CM

## 2023-07-13 DIAGNOSIS — Z80.0 FAMILY HISTORY OF MALIGNANT NEOPLASM OF DIGESTIVE ORGANS: ICD-10-CM

## 2023-07-13 DIAGNOSIS — R10.84 GENERALIZED ABDOMINAL PAIN: ICD-10-CM

## 2023-07-13 DIAGNOSIS — K59.04 CHRONIC IDIOPATHIC CONSTIPATION: ICD-10-CM

## 2023-07-13 PROCEDURE — 99214 OFFICE O/P EST MOD 30 MIN: CPT | Performed by: NURSE PRACTITIONER

## 2023-07-13 RX ORDER — LINACLOTIDE 72 UG/1
72 CAPSULE, GELATIN COATED ORAL
Qty: 30 | Refills: 3 | Status: ACTIVE
Start: 2023-07-13

## 2023-07-13 RX ORDER — ESOMEPRAZOLE MAGNESIUM 40 MG/1
40 CAPSULE, DELAYED RELEASE ORAL
Qty: 30 | Refills: 12 | Status: ACTIVE
Start: 2023-07-13

## 2023-08-24 ENCOUNTER — OFFICE (AMBULATORY)
Dept: URBAN - METROPOLITAN AREA CLINIC 64 | Facility: CLINIC | Age: 48
End: 2023-08-24
Payer: COMMERCIAL

## 2023-08-24 VITALS
SYSTOLIC BLOOD PRESSURE: 129 MMHG | HEIGHT: 62 IN | HEART RATE: 99 BPM | DIASTOLIC BLOOD PRESSURE: 85 MMHG | WEIGHT: 205 LBS

## 2023-08-24 DIAGNOSIS — Z80.0 FAMILY HISTORY OF MALIGNANT NEOPLASM OF DIGESTIVE ORGANS: ICD-10-CM

## 2023-08-24 DIAGNOSIS — R12 HEARTBURN: ICD-10-CM

## 2023-08-24 DIAGNOSIS — K59.04 CHRONIC IDIOPATHIC CONSTIPATION: ICD-10-CM

## 2023-08-24 PROCEDURE — 99214 OFFICE O/P EST MOD 30 MIN: CPT | Performed by: NURSE PRACTITIONER

## 2023-08-24 RX ORDER — LINACLOTIDE 72 UG/1
72 CAPSULE, GELATIN COATED ORAL
Qty: 90 | Refills: 3 | Status: COMPLETED
Start: 2023-08-24 | End: 2023-09-07

## 2023-09-07 ENCOUNTER — OFFICE (AMBULATORY)
Dept: URBAN - METROPOLITAN AREA PATHOLOGY 4 | Facility: PATHOLOGY | Age: 48
End: 2023-09-07
Payer: COMMERCIAL

## 2023-09-07 ENCOUNTER — ON CAMPUS - OUTPATIENT (AMBULATORY)
Dept: URBAN - METROPOLITAN AREA HOSPITAL 2 | Facility: HOSPITAL | Age: 48
End: 2023-09-07
Payer: COMMERCIAL

## 2023-09-07 VITALS
RESPIRATION RATE: 16 BRPM | DIASTOLIC BLOOD PRESSURE: 75 MMHG | HEIGHT: 62 IN | DIASTOLIC BLOOD PRESSURE: 91 MMHG | WEIGHT: 198 LBS | OXYGEN SATURATION: 100 % | DIASTOLIC BLOOD PRESSURE: 66 MMHG | HEART RATE: 95 BPM | SYSTOLIC BLOOD PRESSURE: 153 MMHG | DIASTOLIC BLOOD PRESSURE: 89 MMHG | SYSTOLIC BLOOD PRESSURE: 111 MMHG | OXYGEN SATURATION: 94 % | DIASTOLIC BLOOD PRESSURE: 58 MMHG | RESPIRATION RATE: 17 BRPM | SYSTOLIC BLOOD PRESSURE: 100 MMHG | HEART RATE: 104 BPM | SYSTOLIC BLOOD PRESSURE: 126 MMHG | DIASTOLIC BLOOD PRESSURE: 74 MMHG | OXYGEN SATURATION: 95 % | OXYGEN SATURATION: 96 % | DIASTOLIC BLOOD PRESSURE: 85 MMHG | SYSTOLIC BLOOD PRESSURE: 131 MMHG | HEART RATE: 107 BPM | SYSTOLIC BLOOD PRESSURE: 112 MMHG | SYSTOLIC BLOOD PRESSURE: 110 MMHG | HEART RATE: 110 BPM | OXYGEN SATURATION: 99 % | HEART RATE: 82 BPM | RESPIRATION RATE: 18 BRPM | DIASTOLIC BLOOD PRESSURE: 76 MMHG | SYSTOLIC BLOOD PRESSURE: 106 MMHG | TEMPERATURE: 97.8 F | HEART RATE: 102 BPM | HEART RATE: 105 BPM

## 2023-09-07 DIAGNOSIS — Z80.0 FAMILY HISTORY OF MALIGNANT NEOPLASM OF DIGESTIVE ORGANS: ICD-10-CM

## 2023-09-07 DIAGNOSIS — K62.1 RECTAL POLYP: ICD-10-CM

## 2023-09-07 DIAGNOSIS — Z12.11 ENCOUNTER FOR SCREENING FOR MALIGNANT NEOPLASM OF COLON: ICD-10-CM

## 2023-09-07 LAB
GI HISTOLOGY: A. UNSPECIFIED: (no result)
GI HISTOLOGY: PDF REPORT: (no result)

## 2023-09-07 PROCEDURE — 45380 COLONOSCOPY AND BIOPSY: CPT | Mod: 33 | Performed by: INTERNAL MEDICINE

## 2023-09-07 PROCEDURE — 88305 TISSUE EXAM BY PATHOLOGIST: CPT | Performed by: INTERNAL MEDICINE

## 2023-11-22 ENCOUNTER — OFFICE (AMBULATORY)
Dept: URBAN - METROPOLITAN AREA CLINIC 64 | Facility: CLINIC | Age: 48
End: 2023-11-22
Payer: COMMERCIAL

## 2023-11-22 VITALS
SYSTOLIC BLOOD PRESSURE: 143 MMHG | WEIGHT: 207 LBS | HEIGHT: 62 IN | HEART RATE: 99 BPM | DIASTOLIC BLOOD PRESSURE: 89 MMHG

## 2023-11-22 DIAGNOSIS — R10.84 GENERALIZED ABDOMINAL PAIN: ICD-10-CM

## 2023-11-22 DIAGNOSIS — K31.84 GASTROPARESIS: ICD-10-CM

## 2023-11-22 DIAGNOSIS — Z80.0 FAMILY HISTORY OF MALIGNANT NEOPLASM OF DIGESTIVE ORGANS: ICD-10-CM

## 2023-11-22 DIAGNOSIS — K59.04 CHRONIC IDIOPATHIC CONSTIPATION: ICD-10-CM

## 2023-11-22 PROCEDURE — 99213 OFFICE O/P EST LOW 20 MIN: CPT | Performed by: NURSE PRACTITIONER

## 2023-12-22 ENCOUNTER — OFFICE VISIT (OUTPATIENT)
Dept: ENDOCRINOLOGY | Facility: CLINIC | Age: 48
End: 2023-12-22
Payer: COMMERCIAL

## 2023-12-22 ENCOUNTER — SPECIALTY PHARMACY (OUTPATIENT)
Dept: ENDOCRINOLOGY | Facility: CLINIC | Age: 48
End: 2023-12-22
Payer: COMMERCIAL

## 2023-12-22 VITALS
BODY MASS INDEX: 37.91 KG/M2 | OXYGEN SATURATION: 98 % | HEIGHT: 62 IN | HEART RATE: 119 BPM | DIASTOLIC BLOOD PRESSURE: 78 MMHG | SYSTOLIC BLOOD PRESSURE: 135 MMHG | WEIGHT: 206 LBS

## 2023-12-22 DIAGNOSIS — E10.65 TYPE 1 DIABETES MELLITUS WITH HYPERGLYCEMIA: Primary | ICD-10-CM

## 2023-12-22 DIAGNOSIS — E78.2 MIXED HYPERLIPIDEMIA: ICD-10-CM

## 2023-12-22 LAB — GLUCOSE BLDC GLUCOMTR-MCNC: 166 MG/DL (ref 70–105)

## 2023-12-22 PROCEDURE — 82948 REAGENT STRIP/BLOOD GLUCOSE: CPT | Performed by: INTERNAL MEDICINE

## 2023-12-22 RX ORDER — INSULIN PMP CART,AUT,G6/7,CNTR
1 EACH SUBCUTANEOUS DAILY
Qty: 10 EACH | Refills: 6 | Status: SHIPPED | OUTPATIENT
Start: 2023-12-22

## 2023-12-22 RX ORDER — PROCHLORPERAZINE 25 MG/1
1 SUPPOSITORY RECTAL DAILY
Qty: 1 EACH | Refills: 1 | Status: SHIPPED | OUTPATIENT
Start: 2023-12-22

## 2023-12-22 RX ORDER — PROCHLORPERAZINE 25 MG/1
1 SUPPOSITORY RECTAL DAILY
Qty: 1 EACH | Refills: 0 | Status: SHIPPED | OUTPATIENT
Start: 2023-12-22

## 2023-12-22 RX ORDER — PROCHLORPERAZINE 25 MG/1
SUPPOSITORY RECTAL
Qty: 2 EACH | Refills: 6 | Status: SHIPPED | OUTPATIENT
Start: 2023-12-22

## 2023-12-22 RX ORDER — ESOMEPRAZOLE MAGNESIUM 40 MG/1
40 CAPSULE, DELAYED RELEASE ORAL DAILY
COMMUNITY
Start: 2023-09-14

## 2023-12-22 RX ORDER — CLOBETASOL PROPIONATE 0.46 MG/ML
SOLUTION TOPICAL
COMMUNITY
Start: 2023-10-27

## 2023-12-22 RX ORDER — INSULIN PMP CART,AUT,G6/7,CNTR
1 EACH SUBCUTANEOUS DAILY
Qty: 10 EACH | Refills: 6 | Status: SHIPPED | OUTPATIENT
Start: 2023-12-22 | End: 2023-12-22 | Stop reason: SDUPTHER

## 2023-12-22 RX ORDER — INSULIN PMP CART,AUT,G6/7,CNTR
1 EACH SUBCUTANEOUS DAILY
Qty: 1 KIT | Refills: 0 | Status: SHIPPED | OUTPATIENT
Start: 2023-12-22

## 2023-12-22 RX ORDER — LINACLOTIDE 72 UG/1
1 CAPSULE, GELATIN COATED ORAL DAILY
COMMUNITY
Start: 2023-11-15

## 2023-12-22 RX ORDER — INSULIN PMP CART,AUT,G6/7,CNTR
1 EACH SUBCUTANEOUS DAILY
Qty: 1 KIT | Refills: 0 | Status: SHIPPED | OUTPATIENT
Start: 2023-12-22 | End: 2023-12-22 | Stop reason: SDUPTHER

## 2023-12-22 RX ORDER — SCOLOPAMINE TRANSDERMAL SYSTEM 1 MG/1
PATCH, EXTENDED RELEASE TRANSDERMAL
COMMUNITY
Start: 2023-09-18

## 2023-12-22 NOTE — PROGRESS NOTES
"Endocrine Progress Note Outpatient     Patient Care Team:  Manda Raines APRN as PCP - General (Nurse Practitioner)  Sage Cobos MD as Consulting Physician (Cardiology)    Chief Complaint: Follow-up type 1 diabetes:       HPI: 48-year-old female with history of type 1 diabetes and hyperlipidemia is here for follow-up.    For type 1 diabetes she is currently on Medtronic 770 G system insulin pump.  Her current insulin pump settings are as follows: Basal rates: Midnight 1.60 units/h, 6 AM 1.80 units/h, 8 AM 2.0, 10 PM 1.50 units/h.  Insulin carb ratio is 1 unit for each 3 g of carbohydrate.  Insulin sensitive factor is 1 unit for each 20 with a target blood sugar of 140.  She denies any significant low blood sugars.  She is using point-of-care testing and did bring in blood sugar records for review and most of them are running high especially in the morning and at lunchtime.    Hyperlipidemia: She is currently on Crestor.    She is on lisinopril for renal protection.    Past Medical History:   Diagnosis Date    DKA (diabetic ketoacidoses)     Skin cancer        Social History     Socioeconomic History    Marital status:      Spouse name: Markus    Number of children: 2    Years of education: 14   Tobacco Use    Smoking status: Former     Types: Electronic Cigarette    Smokeless tobacco: Never   Vaping Use    Vaping Use: Former    Substances: Flavoring    Devices: Pre-filled pod   Substance and Sexual Activity    Alcohol use: Never    Drug use: Defer    Sexual activity: Defer       Family History   Problem Relation Age of Onset    Cancer Mother         rectal / colon / skin    Heart disease Father     Hypertension Father     Post-traumatic stress disorder Father        Allergies   Allergen Reactions    Azithromycin Other (See Comments)     rash  rash      Penicillins Rash     severe rash      Seasonal Ic  [Cholestatin] Itching     Other reaction(s): Erythema (finding)  Adhesives; \"Paper Tape is OK\" - " per pt    Erythromycin Base Rash     childhood reaction    Latex Rash    Sulfa Antibiotics Rash     rash         ROS:   Constitutional:  Denies fatigue, tiredness.    Eyes:  Denies change in visual acuity   HENT:  Denies nasal congestion or sore throat   Respiratory: denies cough, shortness of breath.   Cardiovascular:  denies chest pain, edema   GI:  Denies abdominal pain, nausea, vomiting.   Musculoskeletal:  Denies back pain or joint pain   Integument:  Denies dry skin and rash   Neurologic:  Denies headache, focal weakness or sensory changes   Endocrine:  Denies polyuria or polydipsia   Psychiatric:  Denies depression or anxiety      Vitals:    12/22/23 1050   BP: 135/78   Pulse: 119   SpO2: 98%       Physical Exam:  GEN: NAD, conversant  CHEST' CTA  CVS: RRR  PSYCH: AOX3, appropriate mood, affect normal      Results Review:     I reviewed the patient's new clinical results.    Lab Results   Component Value Date    HGBA1C 9.3 (H) 05/23/2022    HGBA1C 8.0 (H) 05/12/2021    HGBA1C 8.7 (H) 03/02/2021      Labs reviewed: Labs from July 8, 2022 showed TSH 2.78, free T4 0.81  Sodium 137, potassium 3.6, chloride 104, CO2 23, glucose 361, BUN 13, creatinine 0.8, AST 25, ALT 50, A1c 8.8, , triglycerides 183, HDL 54 and urine microalbumin was 19.    Medication Review: Reviewed.       Current Outpatient Medications:     betamethasone dipropionate 0.05 % lotion, APPLY TOPICALLY TO THE SCALP EVERY NIGHT AT BEDTIME FOR ITCHING OR RASH OR DRYNESS, Disp: , Rfl:     Blood Glucose Monitoring Suppl (Accu-Chek Guide) w/Device kit, USE AS DIRECTED, Disp: 1 kit, Rfl: 0    clobetasol (TEMOVATE) 0.05 % external solution, APPLY 10 TO 15 DROPS TOPICALLY TO THE SCALP DAILY FOR UP TO 2 WEEKS AS NEEDED FOR FLARES, Disp: , Rfl:     cyclobenzaprine (FLEXERIL) 10 MG tablet, TAKE 1/2 TO 1 TABLET BY MOUTH TWICE DAILY AS NEEDED FOR MUSCLE SPASMS, Disp: , Rfl:     escitalopram (LEXAPRO) 20 MG tablet, , Disp: , Rfl:     esomeprazole (nexIUM)  40 MG capsule, Take 1 capsule by mouth Daily. before a meal, Disp: , Rfl:     GlucaGen HypoKit 1 MG injection, INJECT 1MG IN THE APPROPRIATE MUSCLE AS DIRECTED ONCE FOR 1 DOSE, Disp: 1 each, Rfl: 11    glucose blood (Accu-Chek Guide) test strip, Use to test blood sugars up to 6 times daily. DX:E10.65, Disp: 550 each, Rfl: 2    insulin aspart (NovoLOG) 100 UNIT/ML injection, USE SUBCUTANEOUSLY IN PUMP ICR 12 AM 1:12; 8 AM 1:8; 5 PM 1:9. MAX DAILY DOSE 120 UNITS (DISCARD VIAL 28 DAYS AFTER OPEN), Disp: 110 mL, Rfl: 3    Insulin Aspart (NovoLOG) 100 UNIT/ML injection, USE SUBCUTANEOUSLY IN PUMP ICR 12 AM 1:12; 8 AM 1:8; 5 PM 1:9. MAX DAILY DOSE 120 UNITS (DISCARD VIAL 28 DAYS AFTER OPEN),, Disp: 110 mL, Rfl: 3    Insulin Glargine (Lantus SoloStar) 100 UNIT/ML injection pen, Inject 40 units sq daily., Disp: 45 mL, Rfl: 2    Insulin Pen Needle 31G X 5 MM misc, BD PEN NEEDLE MINI U/F 31G X 5 MM, Disp: , Rfl:     Linzess 72 MCG capsule capsule, Take 1 capsule by mouth Daily., Disp: , Rfl:     lisinopril (PRINIVIL,ZESTRIL) 2.5 MG tablet, TAKE 1 TABLET EVERY DAY, Disp: 90 tablet, Rfl: 3    mupirocin (BACTROBAN) 2 % ointment, APPLY TOPICALLY TO BIOPSY SITES TWICE DAILY, Disp: , Rfl:     ondansetron ODT (ZOFRAN-ODT) 4 MG disintegrating tablet, DISSOLVE 1 TABLET ON THE TONGUE THREE TIMES DAILY AS NEEDED, Disp: , Rfl:     QUEtiapine (SEROquel) 50 MG tablet, 3 tablets Every Night., Disp: , Rfl:     rosuvastatin (CRESTOR) 10 MG tablet, TAKE 1 TABLET EVERY DAY (Patient taking differently: Every Night. TAKE 1 TABLET EVERY NIGHT), Disp: 90 tablet, Rfl: 4    Scopolamine 1 MG/3DAYS patch, APPLY 1 PATCH TOPICALLY TO THE SKIN EVERY 72 HOURS FOR 8 DAYS, Disp: , Rfl:     SUMAtriptan (IMITREX) 100 MG tablet, , Disp: , Rfl:     traMADol (ULTRAM) 50 MG tablet, , Disp: , Rfl:     fluticasone (FLONASE) 50 MCG/ACT nasal spray, 2 sprays into the nostril(s) as directed by provider Daily for 7 days., Disp: 16 g, Rfl: 0      Assessment & Plan   1.   Diabetes mellitus type 1 with hyperglycemia: Uncontrolled with high blood sugars.  She will definitely benefit from a new insulin pump.  I will send a prescription for OmniPod 5 system with Dexcom monitoring system.  For now she will continue her current regimen.  She is advised to always keep glucose source in case of low blood sugars.    2.  Hyperlipidemia: She tells me that she is taking on rosuvastatin.  Will follow lipid panel.    3.  Elevated blood pressure: Blood pressure well controlled.    Assessment and plan from July 11, 2022 reviewed and updated.            Tawanda Lorenz MD FACE.    Much of the above report is an electronic transcription/translation of the spoken language to printed text using Dragon Software. As such, the subtleties and finesse of the spoken language may permit erroneous, or at times, nonsensical words or phrases to be inadvertently transcribed; thus changes may be made at a later date to rectify these errors.

## 2023-12-22 NOTE — PATIENT INSTRUCTIONS
Continue current management  Please upgrade to OmniPod 5 system with Dexcom monitoring system.  Always keep glucose source in case of low blood sugar  Labs today

## 2024-01-10 ENCOUNTER — TELEPHONE (OUTPATIENT)
Dept: ENDOCRINOLOGY | Facility: CLINIC | Age: 49
End: 2024-01-10
Payer: COMMERCIAL

## 2024-01-10 RX ORDER — INSULIN ASPART 100 [IU]/ML
INJECTION, SOLUTION INTRAVENOUS; SUBCUTANEOUS
Qty: 110 ML | Refills: 3 | Status: SHIPPED | OUTPATIENT
Start: 2024-01-10

## 2024-01-10 RX ORDER — PROCHLORPERAZINE 25 MG/1
SUPPOSITORY RECTAL
Qty: 2 EACH | Refills: 6 | Status: SHIPPED | OUTPATIENT
Start: 2024-01-10

## 2024-01-10 NOTE — TELEPHONE ENCOUNTER
Pt here today for Omnipod training, states that she needs Dexcom G6 sensor refills and Novolog insulin refill for pump. RD to send rx per pt request.

## 2024-01-15 ENCOUNTER — TELEPHONE (OUTPATIENT)
Dept: ENDOCRINOLOGY | Facility: CLINIC | Age: 49
End: 2024-01-15
Payer: COMMERCIAL

## 2024-01-15 RX ORDER — INSULIN PMP CART,AUT,G6/7,CNTR
1 EACH SUBCUTANEOUS DAILY
Qty: 15 EACH | Refills: 6 | Status: SHIPPED | OUTPATIENT
Start: 2024-01-15

## 2024-01-15 NOTE — TELEPHONE ENCOUNTER
Pt doing Omnipod pod change more often than order reflects. RD to change Omnipod pod order to be changed every other day.

## 2024-01-26 ENCOUNTER — TELEPHONE (OUTPATIENT)
Dept: ENDOCRINOLOGY | Facility: CLINIC | Age: 49
End: 2024-01-26
Payer: COMMERCIAL

## 2024-01-26 RX ORDER — INSULIN GLARGINE 100 [IU]/ML
INJECTION, SOLUTION SUBCUTANEOUS
Qty: 45 ML | Refills: 2 | Status: SHIPPED | OUTPATIENT
Start: 2024-01-26

## 2024-01-26 RX ORDER — INSULIN ASPART 100 [IU]/ML
INJECTION, SOLUTION INTRAVENOUS; SUBCUTANEOUS
Qty: 110 ML | Refills: 3 | Status: SHIPPED | OUTPATIENT
Start: 2024-01-26

## 2024-01-26 NOTE — TELEPHONE ENCOUNTER
Specialty Pharmacy Patient Management Program       Karyn Zacarias is a 48 y.o. female seen by an Endocrinology provider for Type 1 Diabetes and enrolled in the Endocrinology Patient Management program offered by [facility].      Requested Prescriptions     Signed Prescriptions Disp Refills    Insulin Aspart (NovoLOG) 100 UNIT/ML injection 110 mL 3     Sig: USE SUBCUTANEOUSLY IN PUMP ICR 12 AM 1:12; 8 AM 1:8; 5 PM 1:9. MAX DAILY DOSE 120 UNITS (DISCARD VIAL 28 DAYS AFTER OPEN),     Authorizing Provider: NAYA LORENZ     Ordering User: ALEXANDRE GAGNON    Insulin Glargine (Lantus SoloStar) 100 UNIT/ML injection pen 45 mL 2     Sig: Inject 40 units sq daily.     Authorizing Provider: NAYA LORENZ     Ordering User: ALEXANDRE GAGNON    Insulin Pen Needle 31G X 5 MM misc 100 each 2     Sig: Use 1 each Daily.     Authorizing Provider: NAYA LORENZ User: ALEXANDRE GAGNON       Refill sent in to patient's pharmacy for above medication prescribed by Dr. Lorenz.   Last office visit 12/22/23.  Next visit scheduled 04/09/2024.    Alexandre Gagnon, PharmD  Clinical Specialty Pharmacist, Endocrinology  1/26/2024  14:14 EST

## 2024-02-02 ENCOUNTER — TELEPHONE (OUTPATIENT)
Dept: ENDOCRINOLOGY | Facility: CLINIC | Age: 49
End: 2024-02-02
Payer: COMMERCIAL

## 2024-02-02 RX ORDER — INSULIN GLARGINE 100 [IU]/ML
INJECTION, SOLUTION SUBCUTANEOUS
Qty: 45 ML | Refills: 2 | Status: SHIPPED | OUTPATIENT
Start: 2024-01-26

## 2024-02-02 RX ORDER — INSULIN ASPART 100 [IU]/ML
INJECTION, SOLUTION INTRAVENOUS; SUBCUTANEOUS
Qty: 110 ML | Refills: 3 | Status: SHIPPED | OUTPATIENT
Start: 2024-01-26

## 2024-02-02 NOTE — TELEPHONE ENCOUNTER
Specialty Pharmacy Patient Management Program       Karyn Zacarias is a 48 y.o. female seen by an Endocrinology provider for Type 1 Diabetes and enrolled in the Endocrinology Patient Management program offered by [facility].      Requested Prescriptions     Signed Prescriptions Disp Refills    Insulin Aspart (NovoLOG) 100 UNIT/ML injection 110 mL 3     Sig: USE SUBCUTANEOUSLY IN PUMP ICR 12 AM 1:12; 8 AM 1:8; 5 PM 1:9. MAX DAILY DOSE 120 UNITS (DISCARD VIAL 28 DAYS AFTER OPEN),     Authorizing Provider: NAYA LORENZ     Ordering User: MANNIE CUTLER    Insulin Glargine (Lantus SoloStar) 100 UNIT/ML injection pen 45 mL 2     Sig: Inject 40 units sq daily.     Authorizing Provider: NAYA LORENZ     Ordering User: MANNIE CUTLER    Insulin Pen Needle 31G X 5 MM misc 100 each 2     Sig: Use 1 each Daily.     Authorizing Provider: NAYA LORENZ     Ordering User: MANNIE CUTLER       Refill sent in to patient's pharmacy for above medication prescribed by Dr. Lorenz.   Last office visit 12/22/23.  Next visit scheduled 04/09/24.    Mannie Cutler, PharmD, BCPS, BCCCP  Clinical Specialty Pharmacist, Endocrinology  2/2/2024  16:03 EST

## 2024-02-26 ENCOUNTER — PATIENT MESSAGE (OUTPATIENT)
Dept: ENDOCRINOLOGY | Facility: CLINIC | Age: 49
End: 2024-02-26
Payer: COMMERCIAL

## 2024-02-29 DIAGNOSIS — E10.65 TYPE 1 DIABETES MELLITUS WITH HYPERGLYCEMIA: Primary | ICD-10-CM

## 2024-02-29 RX ORDER — INSULIN PMP CART,AUT,G6/7,CNTR
1 EACH SUBCUTANEOUS EVERY OTHER DAY
Qty: 45 EACH | Refills: 3 | Status: SHIPPED | OUTPATIENT
Start: 2024-02-29

## 2024-02-29 RX ORDER — PROCHLORPERAZINE 25 MG/1
1 SUPPOSITORY RECTAL
Qty: 1 EACH | Refills: 3 | Status: SHIPPED | OUTPATIENT
Start: 2024-02-29

## 2024-02-29 RX ORDER — PROCHLORPERAZINE 25 MG/1
SUPPOSITORY RECTAL
Qty: 9 EACH | Refills: 3 | Status: SHIPPED | OUTPATIENT
Start: 2024-02-29

## 2024-02-29 NOTE — TELEPHONE ENCOUNTER
Spoke with patient and sent 90 rx for Dexcom G6 sensor and transmitter and omnipod 5 G pod to provider for review and signature. Submitted PA through cover my meds for Dexcom G6 sensor and transmitter.     Dexcom G6 Sensor Key: [id number]    Dexcom G6 Transmitter Key: [id number]

## 2024-03-01 NOTE — TELEPHONE ENCOUNTER
Appeal denied, called pt's insurance and all cgm are not covered, it is a plan exclusion. Pt's insurance stated it could possible be processed under medical plan and for pt to contact R directly for a plan exception. Pt notified and voiced understanding.

## 2024-03-13 ENCOUNTER — TELEPHONE (OUTPATIENT)
Dept: ENDOCRINOLOGY | Facility: CLINIC | Age: 49
End: 2024-03-13
Payer: COMMERCIAL

## 2024-03-31 DIAGNOSIS — E10.65 TYPE 1 DIABETES MELLITUS WITH HYPERGLYCEMIA: ICD-10-CM

## 2024-04-02 RX ORDER — PROCHLORPERAZINE 25 MG/1
SUPPOSITORY RECTAL DAILY
Qty: 1 EACH | Refills: 0 | Status: SHIPPED | OUTPATIENT
Start: 2024-04-02

## 2024-04-09 ENCOUNTER — OFFICE VISIT (OUTPATIENT)
Dept: ENDOCRINOLOGY | Facility: CLINIC | Age: 49
End: 2024-04-09
Payer: COMMERCIAL

## 2024-04-09 VITALS
BODY MASS INDEX: 39.93 KG/M2 | HEIGHT: 62 IN | WEIGHT: 217 LBS | OXYGEN SATURATION: 99 % | DIASTOLIC BLOOD PRESSURE: 70 MMHG | SYSTOLIC BLOOD PRESSURE: 125 MMHG | HEART RATE: 116 BPM

## 2024-04-09 DIAGNOSIS — E78.2 MIXED HYPERLIPIDEMIA: ICD-10-CM

## 2024-04-09 DIAGNOSIS — E10.65 TYPE 1 DIABETES MELLITUS WITH HYPERGLYCEMIA: Primary | ICD-10-CM

## 2024-04-09 PROCEDURE — 99214 OFFICE O/P EST MOD 30 MIN: CPT | Performed by: INTERNAL MEDICINE

## 2024-04-09 RX ORDER — MELOXICAM 7.5 MG/1
1 TABLET ORAL DAILY
COMMUNITY
Start: 2024-03-01

## 2024-04-09 RX ORDER — SEMAGLUTIDE 1.34 MG/ML
INJECTION, SOLUTION SUBCUTANEOUS
Qty: 2 ML | Refills: 6 | Status: SHIPPED | OUTPATIENT
Start: 2024-04-09

## 2024-04-09 RX ORDER — KETOCONAZOLE 20 MG/ML
SHAMPOO TOPICAL
COMMUNITY
Start: 2024-03-07

## 2024-04-09 NOTE — PROGRESS NOTES
"Endocrine Progress Note Outpatient     Patient Care Team:  Manda Raines APRN as PCP - General (Nurse Practitioner)  Sage Cobos MD as Consulting Physician (Cardiology)    Chief Complaint: Follow-up type 1 diabetes:     HPI: 48-year-old female with history of type 1 diabetes and hyperlipidemia is here for follow-up.    For type 1 diabetes she is currently on OmniPod 5 system with Dexcom.  She is currently using NovoLog insulin in the pump.  She has not been able to do the labs for this visit.    Her current insulin pump settings are as follows basal rate at midnight is 1.60 units/h, at 6 AM is 1.80 units/h, 8 AM is 2.0 units/h. Her insulin carb ratio is 1 unit for each 3 g of carbohydrate with a correction scale of 1 unit for each 20-minute blood sugar with a target blood sugar 110 and active insulin 3 hours.    Hyperlipidemia: She is currently on Crestor.    She is on lisinopril for renal protection.    Past Medical History:   Diagnosis Date    DKA (diabetic ketoacidoses)     Skin cancer        Social History     Socioeconomic History    Marital status:      Spouse name: Markus    Number of children: 2    Years of education: 14   Tobacco Use    Smoking status: Former     Types: Electronic Cigarette    Smokeless tobacco: Never   Vaping Use    Vaping status: Former    Substances: Flavoring    Devices: Pre-filled pod   Substance and Sexual Activity    Alcohol use: Never    Drug use: Defer    Sexual activity: Defer       Family History   Problem Relation Age of Onset    Cancer Mother         rectal / colon / skin    Heart disease Father     Hypertension Father     Post-traumatic stress disorder Father        Allergies   Allergen Reactions    Azithromycin Other (See Comments)     rash  rash      Penicillins Rash     severe rash      Seasonal Ic  [Cholestatin] Itching     Other reaction(s): Erythema (finding)  Adhesives; \"Paper Tape is OK\" - per pt    Erythromycin Base Rash     childhood reaction    Latex " Rash    Sulfa Antibiotics Rash     rash         ROS:   Constitutional:  Denies fatigue, tiredness.    Eyes:  Denies change in visual acuity   HENT:  Denies nasal congestion or sore throat   Respiratory: denies cough, shortness of breath.   Cardiovascular:  denies chest pain, edema   GI:  Denies abdominal pain, nausea, vomiting.   Musculoskeletal:  Denies back pain or joint pain   Integument:  Denies dry skin and rash   Neurologic:  Denies headache, focal weakness or sensory changes   Endocrine:  Denies polyuria or polydipsia   Psychiatric:  Denies depression or anxiety      Vitals:    04/09/24 1445   BP: 125/70   Pulse: 116   SpO2: 99%       Physical Exam:  GEN: NAD, conversant  CHEST' CTA  CVS: RRR  PSYCH: AOX3, appropriate mood, affect normal      Results Review:     I reviewed the patient's new clinical results.    Lab Results   Component Value Date    HGBA1C 9.3 (H) 05/23/2022    HGBA1C 8.0 (H) 05/12/2021    HGBA1C 8.7 (H) 03/02/2021      Labs reviewed: Labs from July 8, 2022 showed TSH 2.78, free T4 0.81  Sodium 137, potassium 3.6, chloride 104, CO2 23, glucose 361, BUN 13, creatinine 0.8, AST 25, ALT 50, A1c 8.8, , triglycerides 183, HDL 54 and urine microalbumin was 19.    Medication Review: Reviewed.       Current Outpatient Medications:     betamethasone dipropionate 0.05 % lotion, APPLY TOPICALLY TO THE SCALP EVERY NIGHT AT BEDTIME FOR ITCHING OR RASH OR DRYNESS, Disp: , Rfl:     Blood Glucose Monitoring Suppl (Accu-Chek Guide) w/Device kit, USE AS DIRECTED, Disp: 1 kit, Rfl: 0    clobetasol (TEMOVATE) 0.05 % external solution, APPLY 10 TO 15 DROPS TOPICALLY TO THE SCALP DAILY FOR UP TO 2 WEEKS AS NEEDED FOR FLARES, Disp: , Rfl:     Continuous Blood Gluc  (Dexcom G6 ) device, Use 1 Device Daily., Disp: 1 each, Rfl: 1    Continuous Blood Gluc Sensor (Dexcom G6 Sensor), Use Every 10 (Ten) Days. DX: E10.65, Disp: 9 each, Rfl: 3    Continuous Blood Gluc Transmit (Dexcom G6 Transmitter)  misc, USE DAILY, Disp: 1 each, Rfl: 0    cyclobenzaprine (FLEXERIL) 10 MG tablet, TAKE 1/2 TO 1 TABLET BY MOUTH TWICE DAILY AS NEEDED FOR MUSCLE SPASMS, Disp: , Rfl:     escitalopram (LEXAPRO) 20 MG tablet, , Disp: , Rfl:     GlucaGen HypoKit 1 MG injection, INJECT 1MG IN THE APPROPRIATE MUSCLE AS DIRECTED ONCE FOR 1 DOSE, Disp: 1 each, Rfl: 11    glucose blood (Accu-Chek Guide) test strip, Use to test blood sugars up to 6 times daily. DX:E10.65, Disp: 550 each, Rfl: 2    Insulin Aspart (NovoLOG) 100 UNIT/ML injection, USE SUBCUTANEOUSLY IN PUMP ICR 12 AM 1:12; 8 AM 1:8; 5 PM 1:9. MAX DAILY DOSE 120 UNITS (DISCARD VIAL 28 DAYS AFTER OPEN),, Disp: 110 mL, Rfl: 3    Insulin Disposable Pump (Omnipod 5 G6 Intro, Gen 5,) kit, Use 1 package Daily., Disp: 1 kit, Rfl: 0    Insulin Disposable Pump (Omnipod 5 G6 Pod, Gen 5,) misc, Use 1 each Every Other Day. Patient to change pod every other day., Disp: 45 each, Rfl: 3    Insulin Glargine (Lantus SoloStar) 100 UNIT/ML injection pen, Inject 40 units sq daily., Disp: 45 mL, Rfl: 2    Insulin Pen Needle 31G X 5 MM misc, Use 1 each Daily., Disp: 100 each, Rfl: 2    ketoconazole (NIZORAL) 2 % shampoo, LATHER INTO SCALP AND LEAVE FOR 5-10 MINUTES THEN RINSE, Disp: , Rfl:     lisinopril (PRINIVIL,ZESTRIL) 2.5 MG tablet, TAKE 1 TABLET EVERY DAY, Disp: 90 tablet, Rfl: 3    meloxicam (MOBIC) 7.5 MG tablet, Take 1 tablet by mouth Daily., Disp: , Rfl:     mupirocin (BACTROBAN) 2 % ointment, APPLY TOPICALLY TO BIOPSY SITES TWICE DAILY, Disp: , Rfl:     ondansetron ODT (ZOFRAN-ODT) 4 MG disintegrating tablet, DISSOLVE 1 TABLET ON THE TONGUE THREE TIMES DAILY AS NEEDED, Disp: , Rfl:     QUEtiapine (SEROquel) 50 MG tablet, 3 tablets Every Night., Disp: , Rfl:     rosuvastatin (CRESTOR) 10 MG tablet, TAKE 1 TABLET EVERY DAY (Patient taking differently: Every Night. TAKE 1 TABLET EVERY NIGHT), Disp: 90 tablet, Rfl: 4    SUMAtriptan (IMITREX) 100 MG tablet, , Disp: , Rfl:     traMADol (ULTRAM)  50 MG tablet, , Disp: , Rfl:     fluticasone (FLONASE) 50 MCG/ACT nasal spray, 2 sprays into the nostril(s) as directed by provider Daily for 7 days., Disp: 16 g, Rfl: 0      Assessment & Plan   1.  Diabetes mellitus type 1 with hyperglycemia: Uncontrolled, she is using almost 150 units of insulin per day which is making her change her insulin part almost every day.  I will try to use U200 insulin and also will add Ozempic 0.25 mg subcu once a week for 4 weeks and if able to tolerate then increase the dose to 0.5 mg subcu weekly.  She will have to watch for low blood sugars and if she starts having blood sugars less than 100 then we will back off on the insulin dose.  Continue to work on diet and activity.    2.  Hyperlipidemia: She tells me that she is taking on rosuvastatin.  Will follow lipid panel.    Assessment and plan from December 22, 2023 reviewed and updated.            Tawanda Lorenz MD FACE.    Much of the above report is an electronic transcription/translation of the spoken language to printed text using Dragon Software. As such, the subtleties and finesse of the spoken language may permit erroneous, or at times, nonsensical words or phrases to be inadvertently transcribed; thus changes may be made at a later date to rectify these errors.

## 2024-04-09 NOTE — PATIENT INSTRUCTIONS
Please try to see if you can use Humalog/lispro U200 insulin in the pump  Start Ozempic 0.25 mg subcu weekly for 4 weeks and if you are able to tolerate then increase the dose to 0.5 mg subcu weekly  Continue other medications  Continue to work on diet and activity  Always keep glucose source in case of low blood sugar  Download [facility] tali on your phone and set up the account with [facility]  Labs before labs fasting in next few days.

## 2024-04-10 ENCOUNTER — SPECIALTY PHARMACY (OUTPATIENT)
Dept: ENDOCRINOLOGY | Facility: CLINIC | Age: 49
End: 2024-04-10
Payer: COMMERCIAL

## 2024-04-10 NOTE — PROGRESS NOTES
Specialty Pharmacy Patient Management Program  Prior Authorization Denial     Prior Authorization for Ozempic was Denied    Authorization / Reference / Case Number: [id number]    CoverMyMeds Key: [id number]    Prior authorization for Ozempic denied due to not Type 2 Diabetes diagnosis (patient is Type 1). Denial letter uploaded to patient's chart. Patient was counseled that prior authorization was very likely to be denied. Will let Dr. Lorenz know.     Charlene Cutler, PharmD, BCPS, BCCCP  4/10/2024 08:57 EDT

## 2024-04-12 ENCOUNTER — TELEPHONE (OUTPATIENT)
Dept: ENDOCRINOLOGY | Facility: CLINIC | Age: 49
End: 2024-04-12

## 2024-04-12 NOTE — TELEPHONE ENCOUNTER
Caller: Karyn Zacarias    Relationship to patient: Self    Best call back number: [phone] (home) [phone] (work)     Patient is needing: PATIENT IS NOW SHARING DEXCOM INFO THROUGH [facility]. PLEASE REVIEW HER NUMBERS WHEN YOU CAN.

## 2024-04-19 ENCOUNTER — TELEPHONE (OUTPATIENT)
Dept: ENDOCRINOLOGY | Facility: CLINIC | Age: 49
End: 2024-04-19

## 2024-04-29 ENCOUNTER — TELEPHONE (OUTPATIENT)
Dept: ENDOCRINOLOGY | Facility: CLINIC | Age: 49
End: 2024-04-29
Payer: COMMERCIAL

## 2024-04-29 NOTE — TELEPHONE ENCOUNTER
Reviewed venkata report as pt recently switched from U-100 to U-200 insulin. Pt in target 78% of the time, 19% of time spent in high range, 2% very high, and 1% of time in low range 54-69 mg/dL. Noted pattern of low BG around 9PM. Pt to adjust ICR from 1:3 to 1:4 to prevent lows. Walked pt through change in pump. Discussed tx for low BG. Pt states that she is always prepared to treat lows. Pt to call if she continues to experience low BG after adjustment.

## 2024-04-29 NOTE — TELEPHONE ENCOUNTER
Specialty Pharmacy Patient Management Program       Karyn Zacarias is a 48 y.o. female seen by an Endocrinology provider for Type 1 Diabetes and enrolled in the Endocrinology Patient Management program offered by [facility].      Requested Prescriptions     Signed Prescriptions Disp Refills    Insulin Lispro 200 UNIT/ML solution pen-injector 30 mL 6     Sig: Inject 200 Units under the skin into the appropriate area as directed Daily. Please use the insulin pump as directed.     Authorizing Provider: NAYA LORENZ     Ordering User: MANNIE CUTLER       Refill sent in to patient's pharmacy for above medication prescribed by Dr. Lorenz. Pt called to report she has been running out of insulin much more quickly than prescription would indicate. Previous prescription was written for 100 units/day. Pt was switched to U200 insulin earlier this month. While talking to patient, it was discovered that her pump settings were never updated when she switched to reflect now using U200 insulin. However, her blood sugars have vastly improved and patient is better controlled, so will work to update the insulin order to more accurately reflect the current dose. Per [facility] report, patient is average 70 units daily, which would be 140 units daily of u200 insulin. Patient is changing pods every other day and filling pods with 400 units of insulin, so have updated prescription to 200 units daily. Diabetes educator will reach out to patient to further discuss pump settings and education on U-200 insulin in the pump.     Last office visit 4/9/24.  Next visit scheduled 11/5/24.    Mannie Cutler, PharmD, BCPS, BCCCP  Clinical Specialty Pharmacist, Endocrinology  4/29/2024  10:08 EDT

## 2024-06-13 ENCOUNTER — TELEPHONE (OUTPATIENT)
Dept: ENDOCRINOLOGY | Facility: CLINIC | Age: 49
End: 2024-06-13
Payer: COMMERCIAL

## 2024-06-13 NOTE — TELEPHONE ENCOUNTER
Pt called stating that her insurance will cover Mounjaro and Rybelsus but both will require PA. Starting PA for Mounjaro 2.5mg first and then if this is denied also I will speak with provider regarding next step.     PA started Mounjaro 2.5mg  Key: [id number]  Denied    Pt's insurance will not cover Wegovy. Pt would like to know if Rybelsus could be something that she might benefit from? Please advise.

## 2024-06-26 ENCOUNTER — TELEPHONE (OUTPATIENT)
Dept: ENDOCRINOLOGY | Facility: CLINIC | Age: 49
End: 2024-06-26

## 2024-06-26 NOTE — TELEPHONE ENCOUNTER
She is asking for the thyroid labs to be drawn due to weight gain, fatigue and she wants to check thyroid.  Her sister has Graves disease and she wants checked.

## 2024-06-28 DIAGNOSIS — E10.65 TYPE 1 DIABETES MELLITUS WITH HYPERGLYCEMIA: Primary | ICD-10-CM

## 2024-07-03 ENCOUNTER — TELEPHONE (OUTPATIENT)
Dept: ENDOCRINOLOGY | Facility: CLINIC | Age: 49
End: 2024-07-03

## 2024-07-03 NOTE — TELEPHONE ENCOUNTER
Pt called and stated her insurance changed 7/1/24 and they will cover zepbound, wegovy and saxenda for weight loss. Please advise.

## 2024-07-11 ENCOUNTER — TELEPHONE (OUTPATIENT)
Dept: ENDOCRINOLOGY | Facility: CLINIC | Age: 49
End: 2024-07-11
Payer: COMMERCIAL

## 2024-07-11 NOTE — TELEPHONE ENCOUNTER
PA started for Zepbound 2.5mg  Key: [id number]  Denied    Resubmitted PA with BMI attached  Key: [id number]  Denied      Zepbound denied. Pt's insurance will cover wegovy and saxenda. Please advise.

## 2024-07-22 DIAGNOSIS — E10.65 TYPE 1 DIABETES MELLITUS WITH HYPERGLYCEMIA: Primary | ICD-10-CM

## 2024-07-22 RX ORDER — SEMAGLUTIDE 0.25 MG/.5ML
0.25 INJECTION, SOLUTION SUBCUTANEOUS WEEKLY
Qty: 2 ML | Refills: 1 | Status: SHIPPED | OUTPATIENT
Start: 2024-07-22

## 2024-08-02 ENCOUNTER — TELEPHONE (OUTPATIENT)
Dept: ENDOCRINOLOGY | Facility: CLINIC | Age: 49
End: 2024-08-02
Payer: COMMERCIAL

## 2024-08-02 NOTE — TELEPHONE ENCOUNTER
Pt called stating she is willing to pay for the compound zepbound was wanting prescription sent to [facility] in [place], sent msg to provider. Pt also states she lost 5lbs doing a low carb diet. I let the pt know as soon as I hear something from provider about rx I will reach out to her. She stated understanding.

## 2024-08-02 NOTE — TELEPHONE ENCOUNTER
Informed pt she stated understanding. She is going to keep progress notes on her weight loss journey and submit them in the chart and hoping by next appt she can get this approved thru covermymeds

## 2024-08-16 DIAGNOSIS — E10.65 TYPE 1 DIABETES MELLITUS WITH HYPERGLYCEMIA: ICD-10-CM

## 2024-08-17 RX ORDER — PROCHLORPERAZINE 25 MG/1
SUPPOSITORY RECTAL DAILY
Qty: 1 EACH | Refills: 0 | Status: SHIPPED | OUTPATIENT
Start: 2024-08-17

## 2024-09-06 RX ORDER — LANCETS
1 EACH MISCELLANEOUS DAILY
Qty: 550 EACH | Refills: 1 | Status: SHIPPED | OUTPATIENT
Start: 2024-09-06

## 2024-09-06 RX ORDER — BLOOD-GLUCOSE METER
1 EACH MISCELLANEOUS DAILY
Qty: 1 KIT | Refills: 0 | Status: SHIPPED | OUTPATIENT
Start: 2024-09-06

## 2024-09-13 ENCOUNTER — TELEPHONE (OUTPATIENT)
Dept: ENDOCRINOLOGY | Facility: CLINIC | Age: 49
End: 2024-09-13
Payer: COMMERCIAL

## 2024-09-13 NOTE — TELEPHONE ENCOUNTER
Spoke with pt via phone re: [employer] gateway program forms. Pt has not had DM foot exam however MD can check pt does not need exam.     Educator called and received fax from last eye exam. Labs and paperwork pt sent via email scanned into chart.

## 2024-10-19 DIAGNOSIS — E10.65 TYPE 1 DIABETES MELLITUS WITH HYPERGLYCEMIA: ICD-10-CM

## 2024-10-21 RX ORDER — PROCHLORPERAZINE 25 MG/1
SUPPOSITORY RECTAL DAILY
Qty: 1 EACH | Refills: 0 | Status: SHIPPED | OUTPATIENT
Start: 2024-10-21

## 2024-10-30 RX ORDER — GABAPENTIN 300 MG/1
CAPSULE ORAL
COMMUNITY
Start: 2024-08-23

## 2024-10-30 RX ORDER — CLONAZEPAM 0.5 MG/1
TABLET ORAL
COMMUNITY
Start: 2024-09-25

## 2024-10-30 RX ORDER — BUPROPION HYDROCHLORIDE 75 MG/1
TABLET ORAL
COMMUNITY
Start: 2024-10-28

## 2024-10-30 RX ORDER — FLUCONAZOLE 150 MG/1
TABLET ORAL
COMMUNITY
Start: 2024-08-22

## 2024-10-30 RX ORDER — DIAZEPAM 5 MG/1
TABLET ORAL
COMMUNITY
Start: 2024-08-06

## 2024-11-05 ENCOUNTER — OFFICE VISIT (OUTPATIENT)
Dept: ENDOCRINOLOGY | Facility: CLINIC | Age: 49
End: 2024-11-05
Payer: COMMERCIAL

## 2024-11-05 VITALS
OXYGEN SATURATION: 96 % | BODY MASS INDEX: 37.54 KG/M2 | WEIGHT: 204 LBS | DIASTOLIC BLOOD PRESSURE: 70 MMHG | HEART RATE: 99 BPM | SYSTOLIC BLOOD PRESSURE: 125 MMHG | HEIGHT: 62 IN

## 2024-11-05 DIAGNOSIS — E66.01 CLASS 2 SEVERE OBESITY DUE TO EXCESS CALORIES WITH SERIOUS COMORBIDITY AND BODY MASS INDEX (BMI) OF 37.0 TO 37.9 IN ADULT: ICD-10-CM

## 2024-11-05 DIAGNOSIS — E78.2 MIXED HYPERLIPIDEMIA: ICD-10-CM

## 2024-11-05 DIAGNOSIS — E66.812 CLASS 2 SEVERE OBESITY DUE TO EXCESS CALORIES WITH SERIOUS COMORBIDITY AND BODY MASS INDEX (BMI) OF 37.0 TO 37.9 IN ADULT: ICD-10-CM

## 2024-11-05 DIAGNOSIS — E10.65 TYPE 1 DIABETES MELLITUS WITH HYPERGLYCEMIA: Primary | ICD-10-CM

## 2024-11-05 PROCEDURE — 95251 CONT GLUC MNTR ANALYSIS I&R: CPT | Performed by: INTERNAL MEDICINE

## 2024-11-05 PROCEDURE — 99214 OFFICE O/P EST MOD 30 MIN: CPT | Performed by: INTERNAL MEDICINE

## 2024-11-05 RX ORDER — GLUCAGON HYDROCHLORIDE 1 MG
1 KIT INJECTION ONCE
Qty: 1 EACH | Refills: 2 | Status: SHIPPED | OUTPATIENT
Start: 2024-11-05 | End: 2024-11-05

## 2024-11-05 NOTE — PATIENT INSTRUCTIONS
Continue current management  Always keep glucose source in case of low blood sugar  Continue to work on diet and activity  Labs before follow-up.

## 2024-11-05 NOTE — PROGRESS NOTES
Endocrine Progress Note Outpatient     Patient Care Team:  Manda Raines APRN as PCP - General (Nurse Practitioner)  Sage Cobos MD as Consulting Physician (Cardiology)    Chief Complaint: Follow-up type 1 diabetes:     HPI: 48-year-old female with history of type 1 diabetes and hyperlipidemia is here for follow-up.    For type 1 diabetes she is currently on OmniPod 5 system with Dexcom.  She is currently using NovoLog insulin in the pump.      Her current insulin pump settings are as follows basal rate at midnight is 1.60 units/h, at 6 AM is 1.80 units/h, 8 AM is 2.0 units/h. Her insulin carb ratio is 1 unit for each 4 g of carbohydrate with a correction scale of 1 unit for each 20-mg blood sugar with a target blood sugar 110 and active insulin 3 hours.    She is also on Zepbound 2.5 mg sq weekly through a tele company and its compound product. She has losr 26 lbs since 2024.     Hyperlipidemia: She is currently on Crestor.    She is on lisinopril for renal protection.    Her son recently  due to an accidental shooting and she has been under stress and emotional trauma.  She is starting therapy soon.    Past Medical History:   Diagnosis Date    DKA (diabetic ketoacidoses)     Skin cancer        Social History     Socioeconomic History    Marital status:      Spouse name: Markus    Number of children: 2    Years of education: 14   Tobacco Use    Smoking status: Former     Types: Electronic Cigarette    Smokeless tobacco: Never   Vaping Use    Vaping status: Former    Substances: Flavoring    Devices: Pre-filled pod   Substance and Sexual Activity    Alcohol use: Never    Drug use: Defer    Sexual activity: Defer       Family History   Problem Relation Age of Onset    Cancer Mother         rectal / colon / skin    Heart disease Father     Hypertension Father     Post-traumatic stress disorder Father        Allergies   Allergen Reactions    Azithromycin Other (See Comments)     rash  rash    "   Penicillins Rash     severe rash      Seasonal Ic  [Cholestatin] Itching     Other reaction(s): Erythema (finding)  Adhesives; \"Paper Tape is OK\" - per pt    Erythromycin Base Rash     childhood reaction    Latex Rash    Sulfa Antibiotics Rash     rash         ROS:   Constitutional:  Denies fatigue, tiredness.    Eyes:  Denies change in visual acuity   HENT:  Denies nasal congestion or sore throat   Respiratory: denies cough, shortness of breath.   Cardiovascular:  denies chest pain, edema   GI:  Denies abdominal pain, nausea, vomiting.   Musculoskeletal:  Denies back pain or joint pain   Integument:  Denies dry skin and rash   Neurologic:  Denies headache, focal weakness or sensory changes   Endocrine:  Denies polyuria or polydipsia   Psychiatric:  Denies depression or anxiety      Vitals:    11/05/24 1422   BP: 125/70   Pulse: 99   SpO2: 96%       Physical Exam:  GEN: NAD, conversant  CHEST' CTA  CVS: RRR  PSYCH: AOX3, appropriate mood, affect normal      Results Review:     I reviewed the patient's new clinical results.    Lab Results   Component Value Date    HGBA1C 7.0 (H) 08/06/2024    HGBA1C 9.3 (H) 05/23/2022    HGBA1C 8.0 (H) 05/12/2021      HEMOGLOBIN A1C (08/06/2024 09:33)    TSH (08/06/2024 09:33)    MicroAlbumin, Urine, Random - (08/06/2024 09:33)    LIPID PANEL (08/06/2024 09:33)    T4, FREE (08/06/2024 09:33)    COMPREHENSIVE METABOLIC PANEL (08/06/2024 09:33)    Dexcom download interpretation: Dates covered was between October 23, 2024 to November 5, 2024.  Average blood sugar is 164.  Spending 67% in the range and 33% above range and 0% below range.  Glucose graph did not show significant fluctuations.      Medication Review: Reviewed.       Current Outpatient Medications:     betamethasone dipropionate 0.05 % lotion, APPLY TOPICALLY TO THE SCALP EVERY NIGHT AT BEDTIME FOR ITCHING OR RASH OR DRYNESS, Disp: , Rfl:     Blood Glucose Monitoring Suppl (Accu-Chek Guide) w/Device kit, USE AS DIRECTED, " Disp: 1 kit, Rfl: 0    Blood Glucose Monitoring Suppl (Contour Next One) kit, Use 1 each Daily., Disp: 1 kit, Rfl: 0    buPROPion (WELLBUTRIN) 75 MG tablet, , Disp: , Rfl:     clobetasol (TEMOVATE) 0.05 % external solution, APPLY 10 TO 15 DROPS TOPICALLY TO THE SCALP DAILY FOR UP TO 2 WEEKS AS NEEDED FOR FLARES, Disp: , Rfl:     clonazePAM (KlonoPIN) 0.5 MG tablet, , Disp: , Rfl:     Continuous Blood Gluc  (Dexcom G6 ) device, Use 1 Device Daily., Disp: 1 each, Rfl: 1    Continuous Blood Gluc Sensor (Dexcom G6 Sensor), Use Every 10 (Ten) Days. DX: E10.65, Disp: 9 each, Rfl: 3    Continuous Glucose Transmitter (Dexcom G6 Transmitter) misc, USE DAILY, Disp: 1 each, Rfl: 0    cyclobenzaprine (FLEXERIL) 10 MG tablet, TAKE 1/2 TO 1 TABLET BY MOUTH TWICE DAILY AS NEEDED FOR MUSCLE SPASMS, Disp: , Rfl:     diazePAM (VALIUM) 5 MG tablet, TAKE 1 TABLET BY MOUTH 30 MINUTES BEFORE MRI. REPEAT IF NECESSARY, Disp: , Rfl:     Diclofenac Sodium (VOLTAREN) 1 % gel gel, APPLY 4 GRAMS TOPICALLY TO THE AFFECTED AREA FOUR TIMES DAILY. MAX 16 GM / JOINT PER DAY OR 32 GM PER DAY TOTAL, Disp: , Rfl:     escitalopram (LEXAPRO) 20 MG tablet, , Disp: , Rfl:     fluconazole (DIFLUCAN) 150 MG tablet, TAKE 1 TABLET BY MOUTH AS ONE DOSE, Disp: , Rfl:     GlucaGen HypoKit 1 MG injection, INJECT 1MG IN THE APPROPRIATE MUSCLE AS DIRECTED ONCE FOR 1 DOSE, Disp: 1 each, Rfl: 11    glucose blood (Accu-Chek Guide) test strip, Use to test blood sugars up to 6 times daily. DX:E10.65, Disp: 550 each, Rfl: 2    glucose blood (Contour Next Test) test strip, Use to test blood sugars up to 6 times daily. DX:E10.65, Disp: 550 each, Rfl: 1    Insulin Aspart (NovoLOG) 100 UNIT/ML injection, USE SUBCUTANEOUSLY IN PUMP ICR 12 AM 1:12; 8 AM 1:8; 5 PM 1:9. MAX DAILY DOSE 120 UNITS (DISCARD VIAL 28 DAYS AFTER OPEN),, Disp: 110 mL, Rfl: 3    Insulin Disposable Pump (Omnipod 5 G6 Intro, Gen 5,) kit, Use 1 package Daily., Disp: 1 kit, Rfl: 0    Insulin  Disposable Pump (Omnipod 5 G6 Pod, Gen 5,) misc, Use 1 each Every Other Day. Patient to change pod every other day., Disp: 45 each, Rfl: 3    Insulin Glargine (Lantus SoloStar) 100 UNIT/ML injection pen, Inject 40 units sq daily., Disp: 45 mL, Rfl: 2    Insulin Lispro 200 UNIT/ML solution pen-injector, Inject 200 Units under the skin into the appropriate area as directed Daily. Please use the insulin pump as directed., Disp: 30 mL, Rfl: 6    Insulin Pen Needle 31G X 5 MM misc, Use 1 each Daily., Disp: 100 each, Rfl: 2    lisinopril (PRINIVIL,ZESTRIL) 2.5 MG tablet, TAKE 1 TABLET EVERY DAY, Disp: 90 tablet, Rfl: 3    Microlet Lancets misc, Use 1 each Daily. Use to test blood sugars up to 6 times daily. DX:E10.65, Disp: 550 each, Rfl: 1    mupirocin (BACTROBAN) 2 % ointment, APPLY TOPICALLY TO BIOPSY SITES TWICE DAILY, Disp: , Rfl:     ondansetron ODT (ZOFRAN-ODT) 4 MG disintegrating tablet, DISSOLVE 1 TABLET ON THE TONGUE THREE TIMES DAILY AS NEEDED, Disp: , Rfl:     Other, Add info into free text field below,, INJECT 0.5ML (50 UNITS ON INSULIN SYRINGE) INTO SKIN ONCE WEEKLY (Patient taking differently: Zepbound Compund), Disp: , Rfl:     QUEtiapine (SEROquel) 50 MG tablet, 3 tablets Every Night., Disp: , Rfl:     rosuvastatin (CRESTOR) 10 MG tablet, TAKE 1 TABLET EVERY DAY, Disp: 90 tablet, Rfl: 4    SUMAtriptan (IMITREX) 100 MG tablet, , Disp: , Rfl:     Unable to find, INJECT 0.25ML (25 UNITS ON INSULIN SYRINGE) TO 50 UNITS INTO SKIN ONCE WEEKLY, Disp: , Rfl:     fluticasone (FLONASE) 50 MCG/ACT nasal spray, 2 sprays into the nostril(s) as directed by provider Daily for 7 days., Disp: 16 g, Rfl: 0      Assessment & Plan   1.  Diabetes mellitus type 1 with hyperglycemia: Uncontrolled but much better with A1c now at 7% and she is working well with her Mounjaro and OmniPod.  I have changed her basal rate from midnight to midnight at 1.80 units/h.  We will continue to follow blood sugars and she is advised to work on  her diet and activity.  Advised to always keep glucose source in case of low blood sugars.    2.  Hyperlipidemia: She has rosuvastatin at home but has not been taking it on regular basis but she will try it.    3.  Class II obesity with BMI of 37.3.  She has been working on her diet and activity and has lost about 26 pounds of weight.  We will prescribe again Zepbound at 5 mg subcu weekly to see if that will be covered through her insurance to help her weight loss.    Assessment and plan from April 9, 2024 reviewed and updated.            Tawanda Lorenz MD FACE.    Much of the above report is an electronic transcription/translation of the spoken language to printed text using Dragon Software. As such, the subtleties and finesse of the spoken language may permit erroneous, or at times, nonsensical words or phrases to be inadvertently transcribed; thus changes may be made at a later date to rectify these errors.

## 2024-11-06 ENCOUNTER — TELEPHONE (OUTPATIENT)
Dept: ENDOCRINOLOGY | Facility: CLINIC | Age: 49
End: 2024-11-06
Payer: COMMERCIAL

## 2024-11-06 NOTE — TELEPHONE ENCOUNTER
Specialty Pharmacy Patient Management Program  Prior Authorization Denial     Prior Authorization for Zepbound was Denied    Pt referred to pharmacy services 11/5/2024 for Zepbound evaluation for obesity in T1DM.  Unfortunately, PA request was denied d/t plan exclusion as pt's current plan does states it does not cover weight loss agents.      Called pt and notified, pt verbalized understanding and stated she is searching for a plan during open enrollment that will cover these agents.  Pt to reach back out at beginning of 2025 if new plan has potential to cover weight loss agents.    Approval Start Date: N/A  Approval End Date: N/A  Authorization / Reference / Case Number: [id number]    CoverMyMeds Key: [id number]      López Prescott, PharmD, MPH  Clinical Specialty Pharmacist  11/6/2024  08:49 EST

## 2024-11-07 RX ORDER — INSULIN ASPART 100 [IU]/ML
INJECTION, SOLUTION INTRAVENOUS; SUBCUTANEOUS
Qty: 110 ML | Refills: 3 | Status: SHIPPED | OUTPATIENT
Start: 2024-11-07

## 2024-11-15 DIAGNOSIS — E10.65 TYPE 1 DIABETES MELLITUS WITH HYPERGLYCEMIA: ICD-10-CM

## 2024-11-15 RX ORDER — LANCETS
EACH MISCELLANEOUS
Qty: 600 EACH | Refills: 2 | Status: SHIPPED | OUTPATIENT
Start: 2024-11-15

## 2024-11-17 RX ORDER — INSULIN PMP CART,AUT,G6/7,CNTR
EACH SUBCUTANEOUS
Qty: 45 EACH | Refills: 3 | Status: SHIPPED | OUTPATIENT
Start: 2024-11-17

## 2024-11-18 DIAGNOSIS — E10.65 TYPE 1 DIABETES MELLITUS WITH HYPERGLYCEMIA: Primary | ICD-10-CM

## 2024-11-20 DIAGNOSIS — E10.65 TYPE 1 DIABETES MELLITUS WITH HYPERGLYCEMIA: ICD-10-CM

## 2024-11-21 RX ORDER — INSULIN LISPRO 200 [IU]/ML
200 INJECTION, SOLUTION SUBCUTANEOUS EVERY OTHER DAY
Qty: 90 ML | Refills: 1 | Status: SHIPPED | OUTPATIENT
Start: 2024-11-21

## 2024-11-27 DIAGNOSIS — E10.65 TYPE 1 DIABETES MELLITUS WITH HYPERGLYCEMIA: Primary | ICD-10-CM

## 2024-12-01 RX ORDER — INSULIN LISPRO 100 [IU]/ML
INJECTION, SOLUTION INTRAVENOUS; SUBCUTANEOUS
Qty: 110 ML | Refills: 3 | Status: SHIPPED | OUTPATIENT
Start: 2024-12-01

## 2024-12-29 RX ORDER — INSULIN LISPRO 200 [IU]/ML
45 INJECTION, SOLUTION SUBCUTANEOUS DAILY
Qty: 30 ML | Refills: 3 | Status: SHIPPED | OUTPATIENT
Start: 2024-12-29

## 2025-01-21 ENCOUNTER — INPATIENT HOSPITAL (AMBULATORY)
Dept: URBAN - METROPOLITAN AREA HOSPITAL 76 | Facility: HOSPITAL | Age: 50
End: 2025-01-21
Payer: COMMERCIAL

## 2025-01-21 DIAGNOSIS — R11.2 NAUSEA WITH VOMITING, UNSPECIFIED: ICD-10-CM

## 2025-01-21 DIAGNOSIS — R10.9 UNSPECIFIED ABDOMINAL PAIN: ICD-10-CM

## 2025-01-21 PROCEDURE — 99223 1ST HOSP IP/OBS HIGH 75: CPT | Performed by: INTERNAL MEDICINE

## 2025-01-21 NOTE — PROGRESS NOTES
Endocrine Progress Note Outpatient     Patient Care Team:  Manda Raines APRN as PCP - General (Nurse Practitioner)      Chief Complaint: Follow-up type 1 diabetes:      HPI: 45-year-old female with history of type 1 diabetes and hyperlipidemia is here for follow up.     For type 1 diabetes she is currently on [employer]tronic 670 G system insulin pump.  Her current insulin pump settings are as follows: Basal rates: Midnight 1.50 units/h, 6 AM 1.70 units/h, 8 AM 1.80, 10 PM 1.50 units/h.  Insulin carb ratio is 1 unit for each 3 g of carbohydrate.  Insulin sensitive factor is 1 unit for each 20 with a target blood sugar of 140.  She denies any significant low blood sugars.  She does have a CGM S from the [employer] and has been using auto mode.  Denies any significant issues with low blood sugars.  Has not required any hospitalization or emergency room visits since last seen for low or high blood sugars.    I have reviewed her glucose graph, she does have significant hyperglycemia throughout 24-hour..    Hyperlipidemia: She is currently on Crestor.  She has not been taking her cholesterol-lowering medications on regular basis.      Diabetic microvascular complications: None    She is on lisinopril for renal protection.    Past Medical History:   Diagnosis Date   • DKA (diabetic ketoacidoses) (CMS/[facility])        Social History     Socioeconomic History   • Marital status:      Spouse name: Not on file   • Number of children: Not on file   • Years of education: Not on file   • Highest education level: Not on file   Tobacco Use   • Smoking status: Former Smoker     Types: Electronic Cigarette   • Smokeless tobacco: Never Used   Vaping Use   • Vaping Use: Some days   • Substances: Nicotine   • Devices: Pre-filled pod   Substance and Sexual Activity   • Alcohol use: Never   • Drug use: Defer   • Sexual activity: Defer       Family History   Problem Relation Age of Onset   • Cancer Mother         rectal / colon / skin   •  "Heart disease Father    • Hypertension Father    • Post-traumatic stress disorder Father        Allergies   Allergen Reactions   • Azithromycin Other (See Comments)     rash  rash     • Penicillins Rash     severe rash     • Seasonal Ic  [Cholestatin] Itching     Other reaction(s): Erythema (finding)  Adhesives; \"Paper Tape is OK\" - per pt   • Erythromycin Base Rash     childhood reaction   • Latex Rash   • Sulfa Antibiotics Rash     rash         ROS:   Constitutional:  Denies fatigue, tiredness.    Eyes:  Denies change in visual acuity   HENT:  Denies nasal congestion or sore throat   Respiratory: denies cough, shortness of breath.   Cardiovascular:  denies chest pain, edema   GI:  Denies abdominal pain, nausea, vomiting.   Musculoskeletal:  Denies back pain or joint pain   Integument:  Denies dry skin and rash   Neurologic:  Denies headache, focal weakness or sensory changes   Endocrine:  Denies polyuria or polydipsia   Psychiatric:  Denies depression or anxiety      Vitals:    06/15/21 1432   BP: 122/75   Pulse: 104   Temp: 97.5 °F (36.4 °C)   SpO2: 97%       Physical Exam:  GEN: NAD, conversant  EYES: EOMI, PERRL, no conjunctival erythema  NECK: no thyromegaly, full ROM   CV: RRR, no murmurs/rubs/gallops, no peripheral edema  LUNG: CTAB, no wheezes/rales/ronchi  SKIN: no rashes, no acanthosis  MSK: no deformities, full ROM of all extremities  NEURO: no tremors, DTR normal  PSYCH: AOX3, appropriate mood, affect normal      Results Review:     I reviewed the patient's new clinical results.    Lab Results   Component Value Date    HGBA1C 8.0 (H) 05/12/2021    HGBA1C 8.7 (H) 03/02/2021    HGBA1C 7.8 (H) 05/06/2020      Lab Results   Component Value Date    GLUCOSE 153 (H) 04/09/2020    BUN 11 05/12/2021    CREATININE 0.5 (L) 05/12/2021    EGFRIFNONA 84 04/09/2020    BCR 21.0 (H) 05/12/2021    K 4.9 05/12/2021    CO2 26 05/12/2021    CALCIUM 9.5 05/12/2021    ALBUMIN 4.2 05/12/2021    LABIL2 1.6 05/12/2021    AST 14 " (L) 05/12/2021    ALT 20 05/12/2021    CHOL 234 (H) 04/09/2020    TRIG 98 04/09/2020     (H) 04/09/2020    HDL 64 (H) 04/09/2020     Lab Results   Component Value Date    TSH 0.524 06/27/2019    FREET4 0.90 (L) 06/27/2019     Labs from [facility] 10, 2021 showed a sodium 139, potassium 4.0, chloride 105, CO2 27, glucose 143, BUN 12, creatinine 1.6, AST 21, ALT 33, , triglycerides 138 and A1c 8.4 and urine microalbumin ratio was 14.      Medication Review: Reviewed.       Current Outpatient Medications:   •  Accu-Chek Softclix Lancets lancets, Use to test blood sugars up to 6 times daily. DX:E10.65, Disp: 550 each, Rfl: 3  •  betamethasone dipropionate (DIPROLENE) 0.05 % lotion, APPLY TOPICALLY TO THE SCALP EVERY DAY, Disp: , Rfl:   •  Blood Glucose Monitoring Suppl (Accu-Chek Guide) w/Device kit, 1 each Daily. DX:E10.65, Disp: 1 kit, Rfl: 1  •  celecoxib (CeleBREX) 200 MG capsule, TK ONE C PO D, Disp: , Rfl:   •  clonazePAM (KlonoPIN) 0.5 MG tablet, , Disp: , Rfl:   •  Dextrose, Diabetic Use, (GLUCOSE) 77.4 % gel, Take 15 g by mouth., Disp: , Rfl:   •  doxycycline (VIBRAMYICN) 100 MG tablet, TK 1 T PO BID, Disp: , Rfl:   •  escitalopram (LEXAPRO) 20 MG tablet, , Disp: , Rfl:   •  GlucaGen HypoKit 1 MG injection, INJECT AS DIRECTED BY YOUR PHYSICIAN, Disp: 3 each, Rfl: 3  •  glucose blood (Accu-Chek Guide) test strip, Use to test blood sugars up to 6 times daily. DX:E10.65, Disp: 550 each, Rfl: 3  •  Humira Pen 40 MG/0.4ML Pen-injector Kit, , Disp: , Rfl:   •  insulin aspart (NovoLOG) 100 UNIT/ML injection, USE SUBCUTANEOUSLY IN PUMP ICR 12 AM 1:12; 8 AM 1:8; 5 PM 1:9. MAX DAILY DOSE 120 UNITS (DISCARD VIAL 28 DAYS AFTER OPEN), Disp: 110 mL, Rfl: 1  •  insulin detemir (LEVEMIR FLEXTOUCH) 100 UNIT/ML injection, , Disp: , Rfl:   •  Insulin Pen Needle (B-D UF III MINI PEN NEEDLES) 31G X 5 MM misc, BD PEN NEEDLE MINI U/F 31G X 5 MM, Disp: , Rfl:   •  linaclotide (LINZESS) 72 MCG capsule capsule, TK 1 C PO D,  Disp: , Rfl:   •  lisinopril (PRINIVIL,ZESTRIL) 2.5 MG tablet, TAKE 1 TABLET EVERY DAY, Disp: 90 tablet, Rfl: 2  •  meloxicam (MOBIC) 15 MG tablet, TK 1 T PO ONCE A DAY, Disp: , Rfl:   •  metoclopramide (REGLAN) 5 MG tablet, TAKE 1 TABLET BY MOUTH THREE TIMES DAILY WITH MEALS FOR 15 DAYS, Disp: , Rfl:   •  ondansetron ODT (ZOFRAN-ODT) 4 MG disintegrating tablet, DISSOLVE 1 TABLET ON THE TONGUE THREE TIMES DAILY AS NEEDED, Disp: , Rfl:   •  pantoprazole (PROTONIX) 40 MG EC tablet, Take 40 mg by mouth Every Morning., Disp: , Rfl:   •  polyethylene glycol (MIRALAX) 17 GM/SCOOP powder, DISSOLVE 17 GRAMS IN LIQUID AND DRINK BY MOUTH TWICE DAILY, Disp: , Rfl:   •  QUEtiapine (SEROquel) 50 MG tablet, , Disp: , Rfl:   •  rosuvastatin (CRESTOR) 10 MG tablet, TAKE 1 TABLET EVERY DAY (PATIENT NEEDS APPOINTMENT FOR FURTHER REFILLS), Disp: 90 tablet, Rfl: 0  •  sucralfate (CARAFATE) 1 g tablet, TAKE 1 TABLET BY MOUTH FOUR TIMES DAILY BEFORE MEALS AND AT BEDTIME, Disp: , Rfl:   •  SUMAtriptan (IMITREX) 100 MG tablet, , Disp: , Rfl:   •  topiramate (TOPAMAX) 50 MG tablet, , Disp: , Rfl:       Assessment/Plan   1.  Diabetes mellitus type 1: Uncontrolled with worsening of A1c at 8.4%.  Will change basal rates as follows: Midnight 1.55 units/h, 6 AM 1.75 units/h, 8 AM 1.85 units/h, 10 PM is 1.55 units/h.  She will continue insulin carb ratio at 1 unit for each 3 g of carbohydrate with a correction of 1 unit for each 20-minute blood sugar with a target blood sugar of 140.  Recommend to send CGM S data for review.  Advised to always keep glucose source with her in case of low blood sugar and have Glucagon Emergency Kit available all the time.  Also advised to get annual eye exam and flu vaccine. Sick day rules d/w her.     2.  Hyperlipidemia: Uncontrolled, she has been missing her rosuvastatin which she is now taking from regular basis.    3.  Elevated blood pressure: Blood pressure well controlled.            Tawanda Lorenz MD  FACE.    Much of the above report is an electronic transcription/translation of the spoken language to printed text using Dragon Software. As such, the subtleties and finesse of the spoken language may permit erroneous, or at times, nonsensical words or phrases to be inadvertently transcribed; thus changes may be made at a later date to rectify these errors.               No risk alerts present

## 2025-01-22 ENCOUNTER — APPOINTMENT (OUTPATIENT)
Dept: ULTRASOUND IMAGING | Facility: HOSPITAL | Age: 50
End: 2025-01-22
Payer: COMMERCIAL

## 2025-01-22 ENCOUNTER — HOSPITAL ENCOUNTER (EMERGENCY)
Facility: HOSPITAL | Age: 50
Discharge: HOME | End: 2025-01-23
Attending: EMERGENCY MEDICINE
Payer: COMMERCIAL

## 2025-01-22 ENCOUNTER — INPATIENT HOSPITAL (AMBULATORY)
Dept: URBAN - METROPOLITAN AREA HOSPITAL 76 | Facility: HOSPITAL | Age: 50
End: 2025-01-22
Payer: COMMERCIAL

## 2025-01-22 DIAGNOSIS — R10.84 GENERALIZED ABDOMINAL PAIN: Primary | ICD-10-CM

## 2025-01-22 DIAGNOSIS — R11.2 NAUSEA WITH VOMITING, UNSPECIFIED: ICD-10-CM

## 2025-01-22 DIAGNOSIS — K31.84 GASTROPARESIS: ICD-10-CM

## 2025-01-22 DIAGNOSIS — K80.20 CALCULUS OF GALLBLADDER WITHOUT CHOLECYSTITIS WITHOUT OBSTRUCTION: ICD-10-CM

## 2025-01-22 LAB
ALBUMIN SERPL-MCNC: 3.7 G/DL (ref 3.5–5.2)
ALBUMIN/GLOB SERPL: 1.3 G/DL
ALP SERPL-CCNC: 82 U/L (ref 39–117)
ALT SERPL W P-5'-P-CCNC: 18 U/L (ref 1–33)
AMPHET+METHAMPHET UR QL: NEGATIVE
AMPHETAMINES UR QL: NEGATIVE
ANION GAP SERPL CALCULATED.3IONS-SCNC: 13.7 MMOL/L (ref 5–15)
AST SERPL-CCNC: 19 U/L (ref 1–32)
BARBITURATES UR QL SCN: NEGATIVE
BASOPHILS # BLD AUTO: 0.01 10*3/MM3 (ref 0–0.2)
BASOPHILS NFR BLD AUTO: 0.1 % (ref 0–1.5)
BENZODIAZ UR QL SCN: NEGATIVE
BILIRUB SERPL-MCNC: 0.3 MG/DL (ref 0–1.2)
BILIRUB UR QL STRIP: NEGATIVE
BUN SERPL-MCNC: 10 MG/DL (ref 6–20)
BUN/CREAT SERPL: 17.2 (ref 7–25)
BUPRENORPHINE SERPL-MCNC: NEGATIVE NG/ML
CALCIUM SPEC-SCNC: 8.5 MG/DL (ref 8.6–10.5)
CANNABINOIDS SERPL QL: NEGATIVE
CHLORIDE SERPL-SCNC: 107 MMOL/L (ref 98–107)
CLARITY UR: CLEAR
CO2 SERPL-SCNC: 21.3 MMOL/L (ref 22–29)
COCAINE UR QL: NEGATIVE
COLOR UR: YELLOW
CREAT SERPL-MCNC: 0.58 MG/DL (ref 0.57–1)
CRP SERPL-MCNC: 1.05 MG/DL (ref 0–0.5)
DEPRECATED RDW RBC AUTO: 44 FL (ref 37–54)
EGFRCR SERPLBLD CKD-EPI 2021: 110.4 ML/MIN/1.73
EOSINOPHIL # BLD AUTO: 0.08 10*3/MM3 (ref 0–0.4)
EOSINOPHIL NFR BLD AUTO: 0.6 % (ref 0.3–6.2)
ERYTHROCYTE [DISTWIDTH] IN BLOOD BY AUTOMATED COUNT: 13.7 % (ref 12.3–15.4)
GLOBULIN UR ELPH-MCNC: 2.8 GM/DL
GLUCOSE SERPL-MCNC: 111 MG/DL (ref 65–99)
GLUCOSE UR STRIP-MCNC: NEGATIVE MG/DL
HCT VFR BLD AUTO: 39.7 % (ref 34–46.6)
HGB BLD-MCNC: 12.6 G/DL (ref 12–15.9)
HGB UR QL STRIP.AUTO: NEGATIVE
IMM GRANULOCYTES # BLD AUTO: 0.03 10*3/MM3 (ref 0–0.05)
IMM GRANULOCYTES NFR BLD AUTO: 0.2 % (ref 0–0.5)
KETONES UR QL STRIP: ABNORMAL
LEUKOCYTE ESTERASE UR QL STRIP.AUTO: NEGATIVE
LIPASE SERPL-CCNC: 12 U/L (ref 13–60)
LYMPHOCYTES # BLD AUTO: 1.07 10*3/MM3 (ref 0.7–3.1)
LYMPHOCYTES NFR BLD AUTO: 8.6 % (ref 19.6–45.3)
MCH RBC QN AUTO: 27.9 PG (ref 26.6–33)
MCHC RBC AUTO-ENTMCNC: 31.7 G/DL (ref 31.5–35.7)
MCV RBC AUTO: 88 FL (ref 79–97)
METHADONE UR QL SCN: NEGATIVE
MONOCYTES # BLD AUTO: 0.51 10*3/MM3 (ref 0.1–0.9)
MONOCYTES NFR BLD AUTO: 4.1 % (ref 5–12)
NEUTROPHILS NFR BLD AUTO: 10.78 10*3/MM3 (ref 1.7–7)
NEUTROPHILS NFR BLD AUTO: 86.4 % (ref 42.7–76)
NITRITE UR QL STRIP: NEGATIVE
NRBC BLD AUTO-RTO: 0 /100 WBC (ref 0–0.2)
OPIATES UR QL: POSITIVE
OXYCODONE UR QL SCN: NEGATIVE
PCP UR QL SCN: NEGATIVE
PH UR STRIP.AUTO: 5.5 [PH] (ref 5–8)
PLATELET # BLD AUTO: 291 10*3/MM3 (ref 140–450)
PMV BLD AUTO: 9.3 FL (ref 6–12)
POTASSIUM SERPL-SCNC: 3.5 MMOL/L (ref 3.5–5.2)
PROT SERPL-MCNC: 6.5 G/DL (ref 6–8.5)
PROT UR QL STRIP: NEGATIVE
RBC # BLD AUTO: 4.51 10*6/MM3 (ref 3.77–5.28)
SODIUM SERPL-SCNC: 142 MMOL/L (ref 136–145)
SP GR UR STRIP: 1.02 (ref 1–1.03)
TRICYCLICS UR QL SCN: POSITIVE
UROBILINOGEN UR QL STRIP: ABNORMAL
WBC NRBC COR # BLD AUTO: 12.48 10*3/MM3 (ref 3.4–10.8)

## 2025-01-22 PROCEDURE — 25010000002 METOCLOPRAMIDE PER 10 MG: Performed by: EMERGENCY MEDICINE

## 2025-01-22 PROCEDURE — 85025 COMPLETE CBC W/AUTO DIFF WBC: CPT | Performed by: EMERGENCY MEDICINE

## 2025-01-22 PROCEDURE — 25810000003 LACTATED RINGERS SOLUTION: Performed by: EMERGENCY MEDICINE

## 2025-01-22 PROCEDURE — 99232 SBSQ HOSP IP/OBS MODERATE 35: CPT | Performed by: INTERNAL MEDICINE

## 2025-01-22 PROCEDURE — P9612 CATHETERIZE FOR URINE SPEC: HCPCS

## 2025-01-22 PROCEDURE — 86140 C-REACTIVE PROTEIN: CPT | Performed by: EMERGENCY MEDICINE

## 2025-01-22 PROCEDURE — 80306 DRUG TEST PRSMV INSTRMNT: CPT | Performed by: EMERGENCY MEDICINE

## 2025-01-22 PROCEDURE — 96375 TX/PRO/DX INJ NEW DRUG ADDON: CPT

## 2025-01-22 PROCEDURE — 81003 URINALYSIS AUTO W/O SCOPE: CPT | Performed by: EMERGENCY MEDICINE

## 2025-01-22 PROCEDURE — 99284 EMERGENCY DEPT VISIT MOD MDM: CPT

## 2025-01-22 PROCEDURE — 76705 ECHO EXAM OF ABDOMEN: CPT

## 2025-01-22 PROCEDURE — 25010000002 DIPHENHYDRAMINE PER 50 MG: Performed by: EMERGENCY MEDICINE

## 2025-01-22 PROCEDURE — 80053 COMPREHEN METABOLIC PANEL: CPT | Performed by: EMERGENCY MEDICINE

## 2025-01-22 PROCEDURE — 83690 ASSAY OF LIPASE: CPT | Performed by: EMERGENCY MEDICINE

## 2025-01-22 RX ORDER — DIPHENHYDRAMINE HYDROCHLORIDE 50 MG/ML
25 INJECTION INTRAMUSCULAR; INTRAVENOUS ONCE
Status: COMPLETED | OUTPATIENT
Start: 2025-01-22 | End: 2025-01-22

## 2025-01-22 RX ORDER — METOCLOPRAMIDE HYDROCHLORIDE 5 MG/ML
10 INJECTION INTRAMUSCULAR; INTRAVENOUS ONCE
Status: DISCONTINUED | OUTPATIENT
Start: 2025-01-22 | End: 2025-01-23 | Stop reason: HOSPADM

## 2025-01-22 RX ORDER — SODIUM CHLORIDE 0.9 % (FLUSH) 0.9 %
10 SYRINGE (ML) INJECTION AS NEEDED
Status: DISCONTINUED | OUTPATIENT
Start: 2025-01-22 | End: 2025-01-23 | Stop reason: HOSPADM

## 2025-01-22 RX ADMIN — DIPHENHYDRAMINE HYDROCHLORIDE 25 MG: 50 INJECTION, SOLUTION INTRAMUSCULAR; INTRAVENOUS at 22:56

## 2025-01-22 RX ADMIN — Medication 18 MG: at 22:57

## 2025-01-22 RX ADMIN — SODIUM CHLORIDE, POTASSIUM CHLORIDE, SODIUM LACTATE AND CALCIUM CHLORIDE 1000 ML: 600; 310; 30; 20 INJECTION, SOLUTION INTRAVENOUS at 23:06

## 2025-01-23 VITALS
HEIGHT: 62 IN | RESPIRATION RATE: 20 BRPM | BODY MASS INDEX: 36.44 KG/M2 | WEIGHT: 198 LBS | OXYGEN SATURATION: 94 % | TEMPERATURE: 98.5 F | HEART RATE: 106 BPM | SYSTOLIC BLOOD PRESSURE: 132 MMHG | DIASTOLIC BLOOD PRESSURE: 74 MMHG

## 2025-01-23 DIAGNOSIS — E10.65 TYPE 1 DIABETES MELLITUS WITH HYPERGLYCEMIA: ICD-10-CM

## 2025-01-23 PROCEDURE — 25010000002 HYDROMORPHONE 1 MG/ML SOLUTION: Performed by: EMERGENCY MEDICINE

## 2025-01-23 PROCEDURE — 25010000002 ONDANSETRON PER 1 MG: Performed by: EMERGENCY MEDICINE

## 2025-01-23 PROCEDURE — 96365 THER/PROPH/DIAG IV INF INIT: CPT

## 2025-01-23 PROCEDURE — 25010000002 CEFTRIAXONE PER 250 MG: Performed by: EMERGENCY MEDICINE

## 2025-01-23 PROCEDURE — 96375 TX/PRO/DX INJ NEW DRUG ADDON: CPT

## 2025-01-23 RX ORDER — CEFDINIR 300 MG/1
300 CAPSULE ORAL 2 TIMES DAILY
Qty: 10 CAPSULE | Refills: 0 | Status: SHIPPED | OUTPATIENT
Start: 2025-01-23

## 2025-01-23 RX ORDER — OMEPRAZOLE 40 MG/1
40 CAPSULE, DELAYED RELEASE ORAL DAILY
Qty: 30 CAPSULE | Refills: 0 | Status: SHIPPED | OUTPATIENT
Start: 2025-01-23

## 2025-01-23 RX ORDER — ONDANSETRON 2 MG/ML
4 INJECTION INTRAMUSCULAR; INTRAVENOUS ONCE
Status: COMPLETED | OUTPATIENT
Start: 2025-01-23 | End: 2025-01-23

## 2025-01-23 RX ORDER — ONDANSETRON 8 MG/1
8 TABLET, ORALLY DISINTEGRATING ORAL EVERY 8 HOURS PRN
Qty: 12 TABLET | Refills: 0 | Status: SHIPPED | OUTPATIENT
Start: 2025-01-23

## 2025-01-23 RX ORDER — HYDROCODONE BITARTRATE AND ACETAMINOPHEN 7.5; 325 MG/1; MG/1
TABLET ORAL
Qty: 12 TABLET | Refills: 0 | Status: SHIPPED | OUTPATIENT
Start: 2025-01-23

## 2025-01-23 RX ORDER — IBUPROFEN 600 MG/1
TABLET ORAL
Qty: 2 EACH | Refills: 3 | Status: SHIPPED | OUTPATIENT
Start: 2025-01-23

## 2025-01-23 RX ADMIN — CEFTRIAXONE 2000 MG: 2 INJECTION, POWDER, FOR SOLUTION INTRAMUSCULAR; INTRAVENOUS at 00:35

## 2025-01-23 RX ADMIN — ONDANSETRON 4 MG: 2 INJECTION INTRAMUSCULAR; INTRAVENOUS at 01:31

## 2025-01-23 RX ADMIN — HYDROMORPHONE HYDROCHLORIDE 0.5 MG: 1 INJECTION, SOLUTION INTRAMUSCULAR; INTRAVENOUS; SUBCUTANEOUS at 01:31

## 2025-01-23 NOTE — DISCHARGE INSTRUCTIONS
Rest no work or exertion, next 3 days  Medication as directed  Can also use Tylenol as needed for discomfort and any fever  Plenty of fluids  Avoid large meals, fried, spicy, fatty foods  Follow-up with your primary care provider or surgery clinic

## 2025-01-23 NOTE — ED PROVIDER NOTES
"Subjective   History of Present Illness  49-year-old female complaining of abdominal pain and gastroparesis related discomfort.  The patient was hospitalized at [facility] from Monday till today.  The patient was discharged and had return of abdominal pain.  She states that it was just like her previous episodes of gastroparesis she states that she had some pain more on the right side.  She reports no fever or chills.  She reports she has had nausea.  She reports she vomited once.  She reports no diarrhea.  She denies melena hematemesis hematochezia.  She denies night sweats or hemoptysis      Review of Systems   Constitutional:  Positive for fatigue. Negative for chills and fever.   Gastrointestinal:  Positive for abdominal pain, nausea and vomiting. Negative for anal bleeding and blood in stool.   Genitourinary:  Negative for pelvic pain.   Hematological:  Does not bruise/bleed easily.   Psychiatric/Behavioral:  The patient is nervous/anxious.        Past Medical History:   Diagnosis Date    DKA (diabetic ketoacidoses)     Skin cancer        Allergies   Allergen Reactions    Azithromycin Other (See Comments)     rash  rash      Penicillins Rash     severe rash      Seasonal Ic  [Cholestatin] Itching     Other reaction(s): Erythema (finding)  Adhesives; \"Paper Tape is OK\" - per pt    Erythromycin Base Rash     childhood reaction    Latex Rash    Sulfa Antibiotics Rash     rash         Past Surgical History:   Procedure Laterality Date    HIP SURGERY  05/20/2020    HIP SURGERY  07/01/2021       Family History   Problem Relation Age of Onset    Cancer Mother         rectal / colon / skin    Heart disease Father     Hypertension Father     Post-traumatic stress disorder Father        Social History     Socioeconomic History    Marital status:      Spouse name: Markus    Number of children: 2    Years of education: 14   Tobacco Use    Smoking status: Former     Types: Electronic Cigarette    Smokeless " tobacco: Never   Vaping Use    Vaping status: Former    Substances: Flavoring    Devices: Pre-filled pod   Substance and Sexual Activity    Alcohol use: Never    Drug use: Defer    Sexual activity: Defer     Reports no recent unusual food water travel or activity has been under a lot of stress due to recent family stressors      Objective   Physical Exam  Alert Roel Coma Scale 15   HEENT: Pupils equal and reactive to light. Conjunctivae are not injected. Normal tympanic membranes. Oropharynx and nares are normal.   Neck: Supple. Midline trachea. No JVD. No goiter.   Chest: Clear and equal breath sounds bilaterally, regular rate and rhythm without murmur or rub.   Abdomen: Positive bowel sounds, tender right upper quadrant, positive Mcclain sign, the abdomen is obese but nondistended. No rebound or peritoneal signs. No CVA tenderness.   Extremities no clubbing. cyanosis or edema. Motor sensory exam is normal. The full range of motion is intact   Skin: Warm and dry, no rashes or petechia.   Lymphatic: No regional lymphadenopathy. No calf pain, swelling or Homans sign    Procedures           ED Course                                           Labs Reviewed   COMPREHENSIVE METABOLIC PANEL - Abnormal; Notable for the following components:       Result Value    Glucose 111 (*)     CO2 21.3 (*)     Calcium 8.5 (*)     All other components within normal limits    Narrative:     GFR Categories in Chronic Kidney Disease (CKD)      GFR Category          GFR (mL/min/1.73)    Interpretation  G1                     90 or greater         Normal or high (1)  G2                      60-89                Mild decrease (1)  G3a                   45-59                Mild to moderate decrease  G3b                   30-44                Moderate to severe decrease  G4                    15-29                Severe decrease  G5                    14 or less           Kidney failure          (1)In the absence of evidence of kidney  disease, neither GFR category G1 or G2 fulfill the criteria for CKD.    eGFR calculation 2021 CKD-EPI creatinine equation, which does not include race as a factor   LIPASE - Abnormal; Notable for the following components:    Lipase 12 (*)     All other components within normal limits   URINALYSIS W/ CULTURE IF INDICATED - Abnormal; Notable for the following components:    Ketones, UA 40 mg/dL (2+) (*)     All other components within normal limits    Narrative:     In absence of clinical symptoms, the presence of pyuria, bacteria, and/or nitrites on the urinalysis result does not correlate with infection.  Urine microscopic not indicated.   C-REACTIVE PROTEIN - Abnormal; Notable for the following components:    C-Reactive Protein 1.05 (*)     All other components within normal limits   URINE DRUG SCREEN - Abnormal; Notable for the following components:    Opiate Screen Positive (*)     Tricyclic Antidepressants Screen Positive (*)     All other components within normal limits    Narrative:     Cutoff For Drugs Screened:    Amphetamines               500 ng/ml  Barbiturates               200 ng/ml  Benzodiazepines            150 ng/ml  Cocaine                    150 ng/ml  Methadone                  200 ng/ml  Opiates                    100 ng/ml  Phencyclidine               25 ng/ml  THC                         50 ng/ml  Methamphetamine            500 ng/ml  Tricyclic Antidepressants  300 ng/ml  Oxycodone                  100 ng/ml  Buprenorphine               10 ng/ml    The normal value for all drugs tested is negative. This report includes unconfirmed screening results, with the cutoff values listed, to be used for medical treatment purposes only.  Unconfirmed results must not be used for non-medical purposes such as employment or legal testing.  Clinical consideration should be applied to any drug of abuse test, particularly when unconfirmed results are used.    All urine drugs of abuse requests without chain of  custody are for medical screening purposes only.  False positives are possible.     CBC WITH AUTO DIFFERENTIAL - Abnormal; Notable for the following components:    WBC 12.48 (*)     Neutrophil % 86.4 (*)     Lymphocyte % 8.6 (*)     Monocyte % 4.1 (*)     Neutrophils, Absolute 10.78 (*)     All other components within normal limits   CBC AND DIFFERENTIAL    Narrative:     The following orders were created for panel order CBC & Differential.  Procedure                               Abnormality         Status                     ---------                               -----------         ------                     CBC Auto Differential[[id number]]        Abnormal            Final result                 Please view results for these tests on the individual orders.     Medications   sodium chloride 0.9 % flush 10 mL (has no administration in time range)   metoclopramide (REGLAN) injection 10 mg (10 mg Intravenous Not Given 1/22/25 2256)   ondansetron (ZOFRAN) injection 4 mg (has no administration in time range)   HYDROmorphone (DILAUDID) injection 0.5 mg (has no administration in time range)   lactated ringers bolus 1,000 mL (0 mL Intravenous Stopped 1/23/25 0025)   diphenhydrAMINE (BENADRYL) injection 25 mg (25 mg Intravenous Given 1/22/25 2256)   ketamine hcl syringe solution prefilled syringe 18 mg (18 mg Intravenous Given 1/22/25 2257)   cefTRIAXone (ROCEPHIN) 2,000 mg in sodium chloride 0.9 % 100 mL MBP (2,000 mg Intravenous New Bag 1/23/25 0035)     US Abdomen Limited    Result Date: 1/23/2025  Impression: Cholelithiasis with no secondary findings of cholecystitis. Otherwise, no acute process. Electronically Signed: Phyllis Wilde MD  1/23/2025 12:05 AM EST  Workstation ID: [id number]               Medical Decision Making  Patient will be discharged a prescription for hydrocodone, cefdinir, Zofran, omeprazole.  Patient was given referral to surgery clinic specific return precautions were discussed.  The patient stated  that she wanted to be readmitted to the hospital but did not meet current admission criteria.  He patient will return for worsening symptoms    Amount and/or Complexity of Data Reviewed  Labs: ordered.  Radiology: ordered.    Risk  Prescription drug management.        Final diagnoses:   Generalized abdominal pain   Calculus of gallbladder without cholecystitis without obstruction   Gastroparesis       ED Disposition  ED Disposition       ED Disposition   Discharge    Condition   Stable    Comment   --               Manda Raines, APRN  [address] [address]  [phone]          Surgery clinic [facility]  [phone]  Dr. Tripp on-call             Medication List      No changes were made to your prescriptions during this visit.            Blayne Bourne MD  01/23/25 010

## 2025-01-24 ENCOUNTER — OFFICE (AMBULATORY)
Dept: URBAN - METROPOLITAN AREA CLINIC 64 | Facility: CLINIC | Age: 50
End: 2025-01-24
Payer: COMMERCIAL

## 2025-01-24 VITALS
HEART RATE: 99 BPM | HEIGHT: 62 IN | WEIGHT: 199 LBS | SYSTOLIC BLOOD PRESSURE: 130 MMHG | DIASTOLIC BLOOD PRESSURE: 76 MMHG

## 2025-01-24 DIAGNOSIS — Z80.0 FAMILY HISTORY OF MALIGNANT NEOPLASM OF DIGESTIVE ORGANS: ICD-10-CM

## 2025-01-24 DIAGNOSIS — R12 HEARTBURN: ICD-10-CM

## 2025-01-24 DIAGNOSIS — Z15.09 GENETIC SUSCEPTIBILITY TO OTHER MALIGNANT NEOPLASM: ICD-10-CM

## 2025-01-24 DIAGNOSIS — C18.9 MALIGNANT NEOPLASM OF COLON, UNSPECIFIED: ICD-10-CM

## 2025-01-24 DIAGNOSIS — R11.0 NAUSEA: ICD-10-CM

## 2025-01-24 DIAGNOSIS — R13.10 DYSPHAGIA, UNSPECIFIED: ICD-10-CM

## 2025-01-24 DIAGNOSIS — K31.84 GASTROPARESIS: ICD-10-CM

## 2025-01-24 DIAGNOSIS — R10.9 UNSPECIFIED ABDOMINAL PAIN: ICD-10-CM

## 2025-01-24 DIAGNOSIS — K59.04 CHRONIC IDIOPATHIC CONSTIPATION: ICD-10-CM

## 2025-01-24 PROCEDURE — 99214 OFFICE O/P EST MOD 30 MIN: CPT

## 2025-01-27 ENCOUNTER — OFFICE VISIT (OUTPATIENT)
Dept: SURGERY | Facility: CLINIC | Age: 50
End: 2025-01-27
Payer: COMMERCIAL

## 2025-01-27 VITALS
DIASTOLIC BLOOD PRESSURE: 96 MMHG | SYSTOLIC BLOOD PRESSURE: 148 MMHG | TEMPERATURE: 98.6 F | OXYGEN SATURATION: 96 % | BODY MASS INDEX: 36.47 KG/M2 | HEART RATE: 100 BPM | HEIGHT: 62 IN | WEIGHT: 198.2 LBS

## 2025-01-27 DIAGNOSIS — R10.10 UPPER ABDOMINAL PAIN: ICD-10-CM

## 2025-01-27 DIAGNOSIS — K80.20 CALCULUS OF GALLBLADDER WITHOUT CHOLECYSTITIS WITHOUT OBSTRUCTION: Primary | ICD-10-CM

## 2025-01-27 RX ORDER — METOCLOPRAMIDE 5 MG/1
TABLET ORAL
COMMUNITY
Start: 2025-01-22

## 2025-01-27 RX ORDER — SODIUM CHLORIDE 9 MG/ML
40 INJECTION, SOLUTION INTRAVENOUS AS NEEDED
OUTPATIENT
Start: 2025-01-27

## 2025-01-27 RX ORDER — LINACLOTIDE 145 UG/1
CAPSULE, GELATIN COATED ORAL
COMMUNITY
Start: 2025-01-22

## 2025-01-27 RX ORDER — SODIUM CHLORIDE 0.9 % (FLUSH) 0.9 %
3 SYRINGE (ML) INJECTION EVERY 12 HOURS SCHEDULED
OUTPATIENT
Start: 2025-01-27

## 2025-01-27 RX ORDER — INDOCYANINE GREEN AND WATER 25 MG
2.5 KIT INJECTION ONCE
OUTPATIENT
Start: 2025-01-27 | End: 2025-01-27

## 2025-01-27 RX ORDER — SODIUM CHLORIDE 0.9 % (FLUSH) 0.9 %
3-10 SYRINGE (ML) INJECTION AS NEEDED
OUTPATIENT
Start: 2025-01-27

## 2025-01-27 RX ORDER — SODIUM CHLORIDE 9 MG/ML
100 INJECTION, SOLUTION INTRAVENOUS CONTINUOUS
OUTPATIENT
Start: 2025-01-27 | End: 2025-01-28

## 2025-01-27 NOTE — PROGRESS NOTES
General Surgery History and Physical      Referring Provider: Blayne Bourne MD    Chief Complaint:    Abdominal pain    History of Present Illness  The patient is a 49-year-old female here for evaluation of abdominal pain.    She has a history of gastroparesis and has intermittent nausea and vomiting but reports more recently she had several days of severe upper abdominal discomfort which was different from any prior symptoms.  Associated with bloating, nausea, and vomiting.  She presented to multiple emergency departments and was told it was due to her gastroparesis.  She subsequently presented to the [facility] ED for persistent symptoms and ultrasound showed cholelithiasis. She also reports mid-back pain and constipation.  She has been on Zepbound since 08/2024.  Prior to the severe pain she had increased her dose from 2.5 mg to 5 mg last Sunday but reduced it back to 2.5 mg yesterday. Despite these interventions, she continues to experience bloating and pain. She has lost 36 pounds since 08/2024 but has plateaued for the past 6 weeks. Her gastroparesis diagnosis was confirmed at [facility] through an EGD and gastric emptying study. She has been on Linzess.  She has a history of type 1 diabetes and was diagnosed with gastroparesis during a diabetic ketoacidosis episode.     Past Medical History:   Past Medical History:   Diagnosis Date    Cholelithiasis 1/24/25    [facility] ER    Colon polyp 1/2024    2 small polyps no issues    DKA (diabetic ketoacidoses)     PONV (postoperative nausea and vomiting)     Skin cancer       Past Surgical History:    Past Surgical History:   Procedure Laterality Date    APPENDECTOMY      EYE SURGERY      Lasik    HIP ARTHROSCOPY Right 07/01/2021    HIP SURGERY Right 05/20/2020     Family History:    Family History   Problem Relation Age of Onset    Cancer Mother         rectal / colon / skin    Heart disease Father     Hypertension Father     Post-traumatic  "stress disorder Father     Cancer Sister         Skin    Diabetes Sister     Depression Sister     Early death Son     Mental illness Son      Social History:    Social History     Socioeconomic History    Marital status:      Spouse name: Markus    Number of children: 2    Years of education: 14   Tobacco Use    Smoking status: Former     Current packs/day: 1.00     Average packs/day: 1 pack/day for 20.0 years (20.0 ttl pk-yrs)     Types: Cigarettes, Electronic Cigarette     Passive exposure: Past    Smokeless tobacco: Never   Vaping Use    Vaping status: Former    Substances: Flavoring    Devices: Pre-filled pod   Substance and Sexual Activity    Alcohol use: Never    Drug use: Never    Sexual activity: Yes     Partners: Male     Birth control/protection: None, Hysterectomy     Allergies:   Allergies   Allergen Reactions    Azithromycin Other (See Comments)     rash  rash      Penicillins Rash     severe rash  Beta lactam allergy details  Antibiotic reaction: rash, nausea  Age at reaction: unknown  Dose to reaction time: unknown  Reason for antibiotic: unknown  Epinephrine required for reaction?: unknown  Tolerated antibiotics: unknown       Seasonal Ic  [Cholestatin] Itching     Other reaction(s): Erythema (finding)  Adhesives; \"Paper Tape is OK\" - per pt    Erythromycin Base Rash     childhood reaction    Latex Rash    Sulfa Antibiotics Rash     rash       Medications:     Current Outpatient Medications:     Blood Glucose Monitoring Suppl (Accu-Chek Guide) w/Device kit, USE AS DIRECTED, Disp: 1 kit, Rfl: 0    Blood Glucose Monitoring Suppl (Contour Next One) kit, Use 1 each Daily., Disp: 1 kit, Rfl: 0    buPROPion (WELLBUTRIN) 75 MG tablet, , Disp: , Rfl:     cefdinir (OMNICEF) 300 MG capsule, Take 1 capsule by mouth 2 (Two) Times a Day., Disp: 10 capsule, Rfl: 0    clonazePAM (KlonoPIN) 0.5 MG tablet, , Disp: , Rfl:     Continuous Blood Gluc  (Dexcom G6 ) device, Use 1 Device Daily., Disp: " 1 each, Rfl: 1    Continuous Blood Gluc Sensor (Dexcom G6 Sensor), Use Every 10 (Ten) Days. DX: E10.65, Disp: 9 each, Rfl: 3    Continuous Glucose Transmitter (Dexcom G6 Transmitter) misc, USE DAILY, Disp: 1 each, Rfl: 0    Diclofenac Sodium (VOLTAREN) 1 % gel gel, APPLY 4 GRAMS TOPICALLY TO THE AFFECTED AREA FOUR TIMES DAILY. MAX 16 GM / JOINT PER DAY OR 32 GM PER DAY TOTAL, Disp: , Rfl:     escitalopram (LEXAPRO) 20 MG tablet, , Disp: , Rfl:     glucagon (GLUCAGEN) 1 MG injection, INJECT 1 MG SUBCUTANEOUSLY INTO  THE APPROPRIATE AREA AS DIRECTED SEE ADMINISTER INSTRUCTIONS.  FOLLOW PACKAGE DIRECTIONS FOR  LOW BLOOD SUGAR, Disp: 2 each, Rfl: 3    glucose blood (Accu-Chek Guide) test strip, Use to test blood sugars up to 6 times daily. DX:E10.65, Disp: 550 each, Rfl: 2    glucose blood (Contour Next Test) test strip, Use to test blood sugars up to 6 times daily. DX:E10.65, Disp: 550 each, Rfl: 1    HYDROcodone-acetaminophen (NORCO) 7.5-325 MG per tablet, One half or 1 p.o. every 6 hours as needed moderate or severe pain, Disp: 12 tablet, Rfl: 0    Insulin Disposable Pump (Omnipod 5 [id number] Pods Gen 5) misc, CHANGE 1 POD EVERY OTHER DAY, Disp: 45 each, Rfl: 3    Insulin Disposable Pump (Omnipod 5 G6 Intro, Gen 5,) kit, Use 1 package Daily., Disp: 1 kit, Rfl: 0    Insulin Glargine (Lantus SoloStar) 100 UNIT/ML injection pen, Inject 40 units sq daily., Disp: 45 mL, Rfl: 2    Insulin Lispro (HumaLOG KwikPen) 200 UNIT/ML solution pen-injector, Inject 45 Units under the skin into the appropriate area as directed Daily. Use as directed in insulin pump. MDD: 50 units per day, Disp: 30 mL, Rfl: 3    Insulin Pen Needle 31G X 5 MM misc, Use 1 each Daily., Disp: 100 each, Rfl: 2    Linzess 145 MCG capsule capsule, TAKE 1 CAPSULE BY MOUTH EVERY MORNING 30 TO 60 MINUTES BEFORE A MEAL, Disp: , Rfl:     metoclopramide (REGLAN) 5 MG tablet, TAKE 1 TABLET BY MOUTH FOUR TIMES DAILY BEFORE A MEAL AND AT BEDTIME AS DIRECTED, Disp: ,  Rfl:     Microlet Lancets misc, USE TO TEST BLOOD SUGARS UP TO 6 TIMES DAILY, Disp: 600 each, Rfl: 2    mupirocin (BACTROBAN) 2 % ointment, APPLY TOPICALLY TO BIOPSY SITES TWICE DAILY, Disp: , Rfl:     omeprazole (priLOSEC) 40 MG capsule, Take 1 capsule by mouth Daily., Disp: 30 capsule, Rfl: 0    ondansetron ODT (ZOFRAN-ODT) 8 MG disintegrating tablet, Place 1 tablet on the tongue Every 8 (Eight) Hours As Needed for Nausea or Vomiting., Disp: 12 tablet, Rfl: 0    QUEtiapine (SEROquel) 50 MG tablet, 3 tablets Every Night., Disp: , Rfl:     rosuvastatin (CRESTOR) 10 MG tablet, TAKE 1 TABLET EVERY DAY, Disp: 90 tablet, Rfl: 4    SUMAtriptan (IMITREX) 100 MG tablet, , Disp: , Rfl:     Tirzepatide-Weight Management (ZEPBOUND) 5 MG/0.5ML solution auto-injector, Inject 5 mg subcu weekly., Disp: , Rfl:     Radiology/Endoscopy:    Ultrasound abdomen 1/22/25  Impression:  Cholelithiasis with no secondary findings of cholecystitis. Otherwise, no acute process.    Labs:    Recent labs reviewed    Review of Systems:   As noted above in HPI    Physical Exam:   No acute distress, alert  Nonlabored respirations  Abdomen soft, obese, nontender, nondistended  Extremities warm and well-perfused with no gross deformities    Assessment & Plan  49 year old female with history of gastroparesis with recent episode of severe abdominal pain and cholelithiasis seen on imaging.    The etiology of the abdominal pain remains uncertain, with potential causes including gastroparesis, gallstones, or the use of Zepbound. The absence of classic gallbladder symptoms complicates the diagnosis. She has gallstones but no evidence of an inflamed gallbladder. Liver function tests are within normal limits. A comprehensive discussion regarding the risks and benefits of cholecystectomy was conducted. We discussed the etiology of her symptoms could be related to gastroparesis, use of Zepbound, or cholelithiasis.  She expressed a preference for surgical  intervention with the understanding there area associated risks and she may have ongoing symptoms.    Surgical risks discussed include but are not limited to bleeding, infection, conversion to open, hernia, CBD injury requiring further surgery, persistent abdominal pain/symptoms; patient expressed understanding and would like to proceed with cholecystectomy.    Will need to hold Zepbound one week prior to the procedure due to its potential to slow motility and increase the risk of aspiration.     Kell Jay MD  General Surgery    Patient or patient representative verbalized consent for the use of Ambient Listening during the visit with  Kell Jay MD for chart documentation. 1/27/2025  21:05 EST

## 2025-01-27 NOTE — H&P (VIEW-ONLY)
General Surgery History and Physical      Referring Provider: Blayne Bourne MD    Chief Complaint:    Abdominal pain    History of Present Illness  The patient is a 49-year-old female here for evaluation of abdominal pain.    She has a history of gastroparesis and has intermittent nausea and vomiting but reports more recently she had several days of severe upper abdominal discomfort which was different from any prior symptoms.  Associated with bloating, nausea, and vomiting.  She presented to multiple emergency departments and was told it was due to her gastroparesis.  She subsequently presented to the [facility] [place] ED for persistent symptoms and ultrasound showed cholelithiasis. She also reports mid-back pain and constipation.  She has been on Zepbound since 08/2024.  Prior to the severe pain she had increased her dose from 2.5 mg to 5 mg last Sunday but reduced it back to 2.5 mg yesterday. Despite these interventions, she continues to experience bloating and pain. She has lost 36 pounds since 08/2024 but has plateaued for the past 6 weeks. Her gastroparesis diagnosis was confirmed at [facility] through an EGD and gastric emptying study. She has been on Linzess.  She has a history of type 1 diabetes and was diagnosed with gastroparesis during a diabetic ketoacidosis episode.     Past Medical History:   Past Medical History:   Diagnosis Date    Cholelithiasis 1/24/25    [facility] [place] ER    Colon polyp 1/2024    2 small polyps no issues    DKA (diabetic ketoacidoses)     PONV (postoperative nausea and vomiting)     Skin cancer       Past Surgical History:    Past Surgical History:   Procedure Laterality Date    APPENDECTOMY      EYE SURGERY      Lasik    HIP ARTHROSCOPY Right 07/01/2021    HIP SURGERY Right 05/20/2020     Family History:    Family History   Problem Relation Age of Onset    Cancer Mother         rectal / colon / skin    Heart disease Father     Hypertension Father     Post-traumatic  "stress disorder Father     Cancer Sister         Skin    Diabetes Sister     Depression Sister     Early death Son     Mental illness Son      Social History:    Social History     Socioeconomic History    Marital status:      Spouse name: Markus    Number of children: 2    Years of education: 14   Tobacco Use    Smoking status: Former     Current packs/day: 1.00     Average packs/day: 1 pack/day for 20.0 years (20.0 ttl pk-yrs)     Types: Cigarettes, Electronic Cigarette     Passive exposure: Past    Smokeless tobacco: Never   Vaping Use    Vaping status: Former    Substances: Flavoring    Devices: Pre-filled pod   Substance and Sexual Activity    Alcohol use: Never    Drug use: Never    Sexual activity: Yes     Partners: Male     Birth control/protection: None, Hysterectomy     Allergies:   Allergies   Allergen Reactions    Azithromycin Other (See Comments)     rash  rash      Penicillins Rash     severe rash  Beta lactam allergy details  Antibiotic reaction: rash, nausea  Age at reaction: unknown  Dose to reaction time: unknown  Reason for antibiotic: unknown  Epinephrine required for reaction?: unknown  Tolerated antibiotics: unknown       Seasonal Ic  [Cholestatin] Itching     Other reaction(s): Erythema (finding)  Adhesives; \"Paper Tape is OK\" - per pt    Erythromycin Base Rash     childhood reaction    Latex Rash    Sulfa Antibiotics Rash     rash       Medications:     Current Outpatient Medications:     Blood Glucose Monitoring Suppl (Accu-Chek Guide) w/Device kit, USE AS DIRECTED, Disp: 1 kit, Rfl: 0    Blood Glucose Monitoring Suppl (Contour Next One) kit, Use 1 each Daily., Disp: 1 kit, Rfl: 0    buPROPion (WELLBUTRIN) 75 MG tablet, , Disp: , Rfl:     cefdinir (OMNICEF) 300 MG capsule, Take 1 capsule by mouth 2 (Two) Times a Day., Disp: 10 capsule, Rfl: 0    clonazePAM (KlonoPIN) 0.5 MG tablet, , Disp: , Rfl:     Continuous Blood Gluc  (Dexcom G6 ) device, Use 1 Device Daily., Disp: " 1 each, Rfl: 1    Continuous Blood Gluc Sensor (Dexcom G6 Sensor), Use Every 10 (Ten) Days. DX: E10.65, Disp: 9 each, Rfl: 3    Continuous Glucose Transmitter (Dexcom G6 Transmitter) misc, USE DAILY, Disp: 1 each, Rfl: 0    Diclofenac Sodium (VOLTAREN) 1 % gel gel, APPLY 4 GRAMS TOPICALLY TO THE AFFECTED AREA FOUR TIMES DAILY. MAX 16 GM / JOINT PER DAY OR 32 GM PER DAY TOTAL, Disp: , Rfl:     escitalopram (LEXAPRO) 20 MG tablet, , Disp: , Rfl:     glucagon (GLUCAGEN) 1 MG injection, INJECT 1 MG SUBCUTANEOUSLY INTO  THE APPROPRIATE AREA AS DIRECTED SEE ADMINISTER INSTRUCTIONS.  FOLLOW PACKAGE DIRECTIONS FOR  LOW BLOOD SUGAR, Disp: 2 each, Rfl: 3    glucose blood (Accu-Chek Guide) test strip, Use to test blood sugars up to 6 times daily. DX:E10.65, Disp: 550 each, Rfl: 2    glucose blood (Contour Next Test) test strip, Use to test blood sugars up to 6 times daily. DX:E10.65, Disp: 550 each, Rfl: 1    HYDROcodone-acetaminophen (NORCO) 7.5-325 MG per tablet, One half or 1 p.o. every 6 hours as needed moderate or severe pain, Disp: 12 tablet, Rfl: 0    Insulin Disposable Pump (Omnipod 5 [id number] Pods Gen 5) misc, CHANGE 1 POD EVERY OTHER DAY, Disp: 45 each, Rfl: 3    Insulin Disposable Pump (Omnipod 5 G6 Intro, Gen 5,) kit, Use 1 package Daily., Disp: 1 kit, Rfl: 0    Insulin Glargine (Lantus SoloStar) 100 UNIT/ML injection pen, Inject 40 units sq daily., Disp: 45 mL, Rfl: 2    Insulin Lispro (HumaLOG KwikPen) 200 UNIT/ML solution pen-injector, Inject 45 Units under the skin into the appropriate area as directed Daily. Use as directed in insulin pump. MDD: 50 units per day, Disp: 30 mL, Rfl: 3    Insulin Pen Needle 31G X 5 MM misc, Use 1 each Daily., Disp: 100 each, Rfl: 2    Linzess 145 MCG capsule capsule, TAKE 1 CAPSULE BY MOUTH EVERY MORNING 30 TO 60 MINUTES BEFORE A MEAL, Disp: , Rfl:     metoclopramide (REGLAN) 5 MG tablet, TAKE 1 TABLET BY MOUTH FOUR TIMES DAILY BEFORE A MEAL AND AT BEDTIME AS DIRECTED, Disp: ,  Rfl:     Microlet Lancets misc, USE TO TEST BLOOD SUGARS UP TO 6 TIMES DAILY, Disp: 600 each, Rfl: 2    mupirocin (BACTROBAN) 2 % ointment, APPLY TOPICALLY TO BIOPSY SITES TWICE DAILY, Disp: , Rfl:     omeprazole (priLOSEC) 40 MG capsule, Take 1 capsule by mouth Daily., Disp: 30 capsule, Rfl: 0    ondansetron ODT (ZOFRAN-ODT) 8 MG disintegrating tablet, Place 1 tablet on the tongue Every 8 (Eight) Hours As Needed for Nausea or Vomiting., Disp: 12 tablet, Rfl: 0    QUEtiapine (SEROquel) 50 MG tablet, 3 tablets Every Night., Disp: , Rfl:     rosuvastatin (CRESTOR) 10 MG tablet, TAKE 1 TABLET EVERY DAY, Disp: 90 tablet, Rfl: 4    SUMAtriptan (IMITREX) 100 MG tablet, , Disp: , Rfl:     Tirzepatide-Weight Management (ZEPBOUND) 5 MG/0.5ML solution auto-injector, Inject 5 mg subcu weekly., Disp: , Rfl:     Radiology/Endoscopy:    Ultrasound abdomen 1/22/25  Impression:  Cholelithiasis with no secondary findings of cholecystitis. Otherwise, no acute process.    Labs:    Recent labs reviewed    Review of Systems:   As noted above in HPI    Physical Exam:   No acute distress, alert  Nonlabored respirations  Abdomen soft, obese, nontender, nondistended  Extremities warm and well-perfused with no gross deformities    Assessment & Plan  49 year old female with history of gastroparesis with recent episode of severe abdominal pain and cholelithiasis seen on imaging.    The etiology of the abdominal pain remains uncertain, with potential causes including gastroparesis, gallstones, or the use of Zepbound. The absence of classic gallbladder symptoms complicates the diagnosis. She has gallstones but no evidence of an inflamed gallbladder. Liver function tests are within normal limits. A comprehensive discussion regarding the risks and benefits of cholecystectomy was conducted. We discussed the etiology of her symptoms could be related to gastroparesis, use of Zepbound, or cholelithiasis.  She expressed a preference for surgical  intervention with the understanding there area associated risks and she may have ongoing symptoms.    Surgical risks discussed include but are not limited to bleeding, infection, conversion to open, hernia, CBD injury requiring further surgery, persistent abdominal pain/symptoms; patient expressed understanding and would like to proceed with cholecystectomy.    Will need to hold Zepbound one week prior to the procedure due to its potential to slow motility and increase the risk of aspiration.     Kell Jay MD  General Surgery    Patient or patient representative verbalized consent for the use of Ambient Listening during the visit with  Kell Jay MD for chart documentation. 1/27/2025  21:05 EST

## 2025-02-11 DIAGNOSIS — E10.65 TYPE 1 DIABETES MELLITUS WITH HYPERGLYCEMIA: Primary | ICD-10-CM

## 2025-02-11 RX ORDER — BLOOD-GLUCOSE METER
EACH MISCELLANEOUS DAILY
Qty: 1 EACH | Refills: 0 | Status: SHIPPED | OUTPATIENT
Start: 2025-02-11

## 2025-02-12 ENCOUNTER — HOSPITAL ENCOUNTER (OUTPATIENT)
Dept: CARDIOLOGY | Facility: HOSPITAL | Age: 50
Discharge: HOME | End: 2025-02-12
Payer: COMMERCIAL

## 2025-02-12 ENCOUNTER — LAB (OUTPATIENT)
Dept: LAB | Facility: HOSPITAL | Age: 50
End: 2025-02-12
Payer: COMMERCIAL

## 2025-02-12 LAB
DEPRECATED RDW RBC AUTO: 45.6 FL (ref 37–54)
ERYTHROCYTE [DISTWIDTH] IN BLOOD BY AUTOMATED COUNT: 14 % (ref 12.3–15.4)
HCT VFR BLD AUTO: 41.6 % (ref 34–46.6)
HGB BLD-MCNC: 13.4 G/DL (ref 12–15.9)
MCH RBC QN AUTO: 28.5 PG (ref 26.6–33)
MCHC RBC AUTO-ENTMCNC: 32.2 G/DL (ref 31.5–35.7)
MCV RBC AUTO: 88.3 FL (ref 79–97)
PLATELET # BLD AUTO: 313 10*3/MM3 (ref 140–450)
PMV BLD AUTO: 9 FL (ref 6–12)
RBC # BLD AUTO: 4.71 10*6/MM3 (ref 3.77–5.28)
WBC NRBC COR # BLD AUTO: 8.38 10*3/MM3 (ref 3.4–10.8)

## 2025-02-12 PROCEDURE — 85027 COMPLETE CBC AUTOMATED: CPT

## 2025-02-12 PROCEDURE — 93005 ELECTROCARDIOGRAM TRACING: CPT | Performed by: SURGERY

## 2025-02-14 LAB
QT INTERVAL: 348 MS
QTC INTERVAL: 443 MS

## 2025-02-21 ENCOUNTER — ANESTHESIA (OUTPATIENT)
Dept: PERIOP | Facility: HOSPITAL | Age: 50
End: 2025-02-21
Payer: COMMERCIAL

## 2025-02-21 ENCOUNTER — HOSPITAL ENCOUNTER (OUTPATIENT)
Facility: HOSPITAL | Age: 50
Setting detail: HOSPITAL OUTPATIENT SURGERY
Discharge: HOME | End: 2025-02-21
Attending: SURGERY | Admitting: SURGERY
Payer: COMMERCIAL

## 2025-02-21 ENCOUNTER — ANESTHESIA EVENT (OUTPATIENT)
Dept: PERIOP | Facility: HOSPITAL | Age: 50
End: 2025-02-21
Payer: COMMERCIAL

## 2025-02-21 VITALS
RESPIRATION RATE: 17 BRPM | HEIGHT: 62 IN | HEART RATE: 80 BPM | SYSTOLIC BLOOD PRESSURE: 112 MMHG | WEIGHT: 194.4 LBS | BODY MASS INDEX: 35.77 KG/M2 | OXYGEN SATURATION: 95 % | DIASTOLIC BLOOD PRESSURE: 66 MMHG | TEMPERATURE: 98.6 F

## 2025-02-21 DIAGNOSIS — R10.10 UPPER ABDOMINAL PAIN: ICD-10-CM

## 2025-02-21 DIAGNOSIS — K80.20 CALCULUS OF GALLBLADDER WITHOUT CHOLECYSTITIS WITHOUT OBSTRUCTION: ICD-10-CM

## 2025-02-21 DIAGNOSIS — R10.84 GENERALIZED ABDOMINAL PAIN: ICD-10-CM

## 2025-02-21 DIAGNOSIS — K31.84 GASTROPARESIS: ICD-10-CM

## 2025-02-21 LAB
GLUCOSE BLDC GLUCOMTR-MCNC: 128 MG/DL (ref 70–105)
GLUCOSE BLDC GLUCOMTR-MCNC: 158 MG/DL (ref 70–105)

## 2025-02-21 PROCEDURE — 25010000002 HYDROMORPHONE 1 MG/ML SOLUTION

## 2025-02-21 PROCEDURE — 25010000002 MIDAZOLAM PER 1 MG

## 2025-02-21 PROCEDURE — 25010000002 FENTANYL CITRATE (PF) 50 MCG/ML SOLUTION

## 2025-02-21 PROCEDURE — 25010000002 BUPIVACAINE 0.25 % SOLUTION: Performed by: SURGERY

## 2025-02-21 PROCEDURE — 25010000002 PROPOFOL 1000 MG/100ML EMULSION

## 2025-02-21 PROCEDURE — 88304 TISSUE EXAM BY PATHOLOGIST: CPT | Performed by: SURGERY

## 2025-02-21 PROCEDURE — 25810000003 LACTATED RINGERS PER 1000 ML: Performed by: ANESTHESIOLOGY

## 2025-02-21 PROCEDURE — 25010000002 MAGNESIUM SULFATE PER 500 MG OF MAGNESIUM

## 2025-02-21 PROCEDURE — 47563 LAPARO CHOLECYSTECTOMY/GRAPH: CPT

## 2025-02-21 PROCEDURE — 82948 REAGENT STRIP/BLOOD GLUCOSE: CPT

## 2025-02-21 PROCEDURE — 47563 LAPARO CHOLECYSTECTOMY/GRAPH: CPT | Performed by: SURGERY

## 2025-02-21 PROCEDURE — 25010000002 KETOROLAC TROMETHAMINE PER 15 MG

## 2025-02-21 PROCEDURE — 25010000002 DEXAMETHASONE PER 1 MG

## 2025-02-21 PROCEDURE — 25010000002 CEFAZOLIN PER 500 MG: Performed by: SURGERY

## 2025-02-21 PROCEDURE — 25010000002 SUGAMMADEX 200 MG/2ML SOLUTION

## 2025-02-21 PROCEDURE — 25010000002 LIDOCAINE PF 2% 2 % SOLUTION

## 2025-02-21 PROCEDURE — 25010000002 INDOCYANINE GREEN 25 MG RECONSTITUTED SOLUTION: Performed by: SURGERY

## 2025-02-21 PROCEDURE — 25010000002 ONDANSETRON PER 1 MG

## 2025-02-21 DEVICE — MEDIUM-LARGE LIGATION CLIPS 6 CLIPS/CART
Type: IMPLANTABLE DEVICE | Site: ABDOMEN | Status: FUNCTIONAL
Brand: VAS-Q-CLIP

## 2025-02-21 RX ORDER — IPRATROPIUM BROMIDE AND ALBUTEROL SULFATE 2.5; .5 MG/3ML; MG/3ML
3 SOLUTION RESPIRATORY (INHALATION) ONCE AS NEEDED
Status: DISCONTINUED | OUTPATIENT
Start: 2025-02-21 | End: 2025-02-21 | Stop reason: HOSPADM

## 2025-02-21 RX ORDER — MAGNESIUM SULFATE HEPTAHYDRATE 500 MG/ML
INJECTION, SOLUTION INTRAMUSCULAR; INTRAVENOUS AS NEEDED
Status: DISCONTINUED | OUTPATIENT
Start: 2025-02-21 | End: 2025-02-21 | Stop reason: SURG

## 2025-02-21 RX ORDER — INDOCYANINE GREEN AND WATER 25 MG
2.5 KIT INJECTION ONCE
Status: COMPLETED | OUTPATIENT
Start: 2025-02-21 | End: 2025-02-21

## 2025-02-21 RX ORDER — KETOROLAC TROMETHAMINE 30 MG/ML
INJECTION, SOLUTION INTRAMUSCULAR; INTRAVENOUS AS NEEDED
Status: DISCONTINUED | OUTPATIENT
Start: 2025-02-21 | End: 2025-02-21 | Stop reason: SURG

## 2025-02-21 RX ORDER — DEXAMETHASONE SODIUM PHOSPHATE 4 MG/ML
INJECTION, SOLUTION INTRA-ARTICULAR; INTRALESIONAL; INTRAMUSCULAR; INTRAVENOUS; SOFT TISSUE AS NEEDED
Status: DISCONTINUED | OUTPATIENT
Start: 2025-02-21 | End: 2025-02-21 | Stop reason: SURG

## 2025-02-21 RX ORDER — SODIUM CHLORIDE 9 MG/ML
40 INJECTION, SOLUTION INTRAVENOUS AS NEEDED
Status: DISCONTINUED | OUTPATIENT
Start: 2025-02-21 | End: 2025-02-21 | Stop reason: HOSPADM

## 2025-02-21 RX ORDER — SCOPOLAMINE 1 MG/3D
1 PATCH, EXTENDED RELEASE TRANSDERMAL
Status: DISCONTINUED | OUTPATIENT
Start: 2025-02-21 | End: 2025-02-21 | Stop reason: HOSPADM

## 2025-02-21 RX ORDER — MEPERIDINE HYDROCHLORIDE 25 MG/ML
12.5 INJECTION INTRAMUSCULAR; INTRAVENOUS; SUBCUTANEOUS
Status: DISCONTINUED | OUTPATIENT
Start: 2025-02-21 | End: 2025-02-21 | Stop reason: HOSPADM

## 2025-02-21 RX ORDER — LIDOCAINE HYDROCHLORIDE 20 MG/ML
INJECTION, SOLUTION EPIDURAL; INFILTRATION; INTRACAUDAL; PERINEURAL AS NEEDED
Status: DISCONTINUED | OUTPATIENT
Start: 2025-02-21 | End: 2025-02-21 | Stop reason: SURG

## 2025-02-21 RX ORDER — DEXMEDETOMIDINE HYDROCHLORIDE 100 UG/ML
INJECTION, SOLUTION INTRAVENOUS AS NEEDED
Status: DISCONTINUED | OUTPATIENT
Start: 2025-02-21 | End: 2025-02-21 | Stop reason: SURG

## 2025-02-21 RX ORDER — LABETALOL HYDROCHLORIDE 5 MG/ML
5 INJECTION, SOLUTION INTRAVENOUS
Status: DISCONTINUED | OUTPATIENT
Start: 2025-02-21 | End: 2025-02-21 | Stop reason: HOSPADM

## 2025-02-21 RX ORDER — SODIUM CHLORIDE, SODIUM LACTATE, POTASSIUM CHLORIDE, CALCIUM CHLORIDE 600; 310; 30; 20 MG/100ML; MG/100ML; MG/100ML; MG/100ML
20 INJECTION, SOLUTION INTRAVENOUS CONTINUOUS
Status: DISCONTINUED | OUTPATIENT
Start: 2025-02-21 | End: 2025-02-21 | Stop reason: HOSPADM

## 2025-02-21 RX ORDER — ROCURONIUM BROMIDE 10 MG/ML
INJECTION, SOLUTION INTRAVENOUS AS NEEDED
Status: DISCONTINUED | OUTPATIENT
Start: 2025-02-21 | End: 2025-02-21 | Stop reason: SURG

## 2025-02-21 RX ORDER — FLUMAZENIL 0.1 MG/ML
0.2 INJECTION INTRAVENOUS AS NEEDED
Status: DISCONTINUED | OUTPATIENT
Start: 2025-02-21 | End: 2025-02-21 | Stop reason: HOSPADM

## 2025-02-21 RX ORDER — MIDAZOLAM HYDROCHLORIDE 1 MG/ML
INJECTION, SOLUTION INTRAMUSCULAR; INTRAVENOUS AS NEEDED
Status: DISCONTINUED | OUTPATIENT
Start: 2025-02-21 | End: 2025-02-21 | Stop reason: SURG

## 2025-02-21 RX ORDER — OXYCODONE HYDROCHLORIDE 5 MG/1
10 TABLET ORAL EVERY 4 HOURS PRN
Status: DISCONTINUED | OUTPATIENT
Start: 2025-02-21 | End: 2025-02-21 | Stop reason: HOSPADM

## 2025-02-21 RX ORDER — PROPOFOL 10 MG/ML
INJECTION, EMULSION INTRAVENOUS AS NEEDED
Status: DISCONTINUED | OUTPATIENT
Start: 2025-02-21 | End: 2025-02-21 | Stop reason: SURG

## 2025-02-21 RX ORDER — SODIUM CHLORIDE 0.9 % (FLUSH) 0.9 %
3 SYRINGE (ML) INJECTION EVERY 12 HOURS SCHEDULED
Status: DISCONTINUED | OUTPATIENT
Start: 2025-02-21 | End: 2025-02-21 | Stop reason: HOSPADM

## 2025-02-21 RX ORDER — DIPHENHYDRAMINE HYDROCHLORIDE 50 MG/ML
12.5 INJECTION INTRAMUSCULAR; INTRAVENOUS
Status: DISCONTINUED | OUTPATIENT
Start: 2025-02-21 | End: 2025-02-21 | Stop reason: HOSPADM

## 2025-02-21 RX ORDER — ONDANSETRON 2 MG/ML
4 INJECTION INTRAMUSCULAR; INTRAVENOUS ONCE AS NEEDED
Status: DISCONTINUED | OUTPATIENT
Start: 2025-02-21 | End: 2025-02-21 | Stop reason: HOSPADM

## 2025-02-21 RX ORDER — SODIUM CHLORIDE 0.9 % (FLUSH) 0.9 %
10 SYRINGE (ML) INJECTION AS NEEDED
Status: DISCONTINUED | OUTPATIENT
Start: 2025-02-21 | End: 2025-02-21 | Stop reason: HOSPADM

## 2025-02-21 RX ORDER — DIPHENHYDRAMINE HYDROCHLORIDE 50 MG/ML
12.5 INJECTION INTRAMUSCULAR; INTRAVENOUS ONCE AS NEEDED
Status: DISCONTINUED | OUTPATIENT
Start: 2025-02-21 | End: 2025-02-21 | Stop reason: HOSPADM

## 2025-02-21 RX ORDER — ONDANSETRON 2 MG/ML
INJECTION INTRAMUSCULAR; INTRAVENOUS AS NEEDED
Status: DISCONTINUED | OUTPATIENT
Start: 2025-02-21 | End: 2025-02-21 | Stop reason: SURG

## 2025-02-21 RX ORDER — HYDROCODONE BITARTRATE AND ACETAMINOPHEN 5; 325 MG/1; MG/1
1 TABLET ORAL EVERY 6 HOURS PRN
Qty: 15 TABLET | Refills: 0 | Status: SHIPPED | OUTPATIENT
Start: 2025-02-21 | End: 2025-02-26

## 2025-02-21 RX ORDER — HYDROCODONE BITARTRATE AND ACETAMINOPHEN 5; 325 MG/1; MG/1
1 TABLET ORAL ONCE AS NEEDED
Status: COMPLETED | OUTPATIENT
Start: 2025-02-21 | End: 2025-02-21

## 2025-02-21 RX ORDER — OXYCODONE HYDROCHLORIDE 5 MG/1
5 TABLET ORAL ONCE AS NEEDED
Status: DISCONTINUED | OUTPATIENT
Start: 2025-02-21 | End: 2025-02-21 | Stop reason: HOSPADM

## 2025-02-21 RX ORDER — MIDAZOLAM HYDROCHLORIDE 1 MG/ML
1 INJECTION, SOLUTION INTRAMUSCULAR; INTRAVENOUS
Status: DISCONTINUED | OUTPATIENT
Start: 2025-02-21 | End: 2025-02-21 | Stop reason: HOSPADM

## 2025-02-21 RX ORDER — SODIUM CHLORIDE 0.9 % (FLUSH) 0.9 %
3-10 SYRINGE (ML) INJECTION AS NEEDED
Status: DISCONTINUED | OUTPATIENT
Start: 2025-02-21 | End: 2025-02-21 | Stop reason: HOSPADM

## 2025-02-21 RX ORDER — HYDRALAZINE HYDROCHLORIDE 20 MG/ML
5 INJECTION INTRAMUSCULAR; INTRAVENOUS
Status: DISCONTINUED | OUTPATIENT
Start: 2025-02-21 | End: 2025-02-21 | Stop reason: HOSPADM

## 2025-02-21 RX ORDER — EPHEDRINE SULFATE 5 MG/ML
5 INJECTION INTRAVENOUS ONCE AS NEEDED
Status: DISCONTINUED | OUTPATIENT
Start: 2025-02-21 | End: 2025-02-21 | Stop reason: HOSPADM

## 2025-02-21 RX ORDER — BUPIVACAINE HYDROCHLORIDE 2.5 MG/ML
INJECTION, SOLUTION INFILTRATION; PERINEURAL AS NEEDED
Status: DISCONTINUED | OUTPATIENT
Start: 2025-02-21 | End: 2025-02-21 | Stop reason: HOSPADM

## 2025-02-21 RX ORDER — FENTANYL CITRATE 50 UG/ML
50 INJECTION, SOLUTION INTRAMUSCULAR; INTRAVENOUS
Status: DISCONTINUED | OUTPATIENT
Start: 2025-02-21 | End: 2025-02-21 | Stop reason: HOSPADM

## 2025-02-21 RX ORDER — SODIUM CHLORIDE 9 MG/ML
100 INJECTION, SOLUTION INTRAVENOUS CONTINUOUS
Status: DISCONTINUED | OUTPATIENT
Start: 2025-02-21 | End: 2025-02-21 | Stop reason: HOSPADM

## 2025-02-21 RX ORDER — LIDOCAINE HYDROCHLORIDE 10 MG/ML
0.5 INJECTION, SOLUTION EPIDURAL; INFILTRATION; INTRACAUDAL; PERINEURAL ONCE AS NEEDED
Status: DISCONTINUED | OUTPATIENT
Start: 2025-02-21 | End: 2025-02-21 | Stop reason: HOSPADM

## 2025-02-21 RX ORDER — NALOXONE HCL 0.4 MG/ML
0.4 VIAL (ML) INJECTION AS NEEDED
Status: DISCONTINUED | OUTPATIENT
Start: 2025-02-21 | End: 2025-02-21 | Stop reason: HOSPADM

## 2025-02-21 RX ADMIN — MIDAZOLAM 2 MG: 1 INJECTION INTRAMUSCULAR; INTRAVENOUS at 08:03

## 2025-02-21 RX ADMIN — SODIUM CHLORIDE, POTASSIUM CHLORIDE, SODIUM LACTATE AND CALCIUM CHLORIDE 20 ML/HR: 600; 310; 30; 20 INJECTION, SOLUTION INTRAVENOUS at 08:00

## 2025-02-21 RX ADMIN — SUGAMMADEX 200 MG: 100 INJECTION, SOLUTION INTRAVENOUS at 09:08

## 2025-02-21 RX ADMIN — PROPOFOL INJECTABLE EMULSION 200 MG: 10 INJECTION, EMULSION INTRAVENOUS at 08:10

## 2025-02-21 RX ADMIN — HYDROMORPHONE HYDROCHLORIDE 1 MG: 1 INJECTION, SOLUTION INTRAMUSCULAR; INTRAVENOUS; SUBCUTANEOUS at 08:10

## 2025-02-21 RX ADMIN — PROPOFOL INJECTABLE EMULSION 200 MCG/KG/MIN: 10 INJECTION, EMULSION INTRAVENOUS at 08:11

## 2025-02-21 RX ADMIN — DEXMEDETOMIDINE HYDROCHLORIDE 10 MCG: 100 INJECTION, SOLUTION, CONCENTRATE INTRAVENOUS at 09:05

## 2025-02-21 RX ADMIN — FENTANYL CITRATE 50 MCG: 50 INJECTION, SOLUTION INTRAMUSCULAR; INTRAVENOUS at 09:57

## 2025-02-21 RX ADMIN — INDOCYANINE GREEN AND WATER 2.5 MG: KIT at 07:49

## 2025-02-21 RX ADMIN — ROCURONIUM BROMIDE 30 MG: 10 INJECTION, SOLUTION INTRAVENOUS at 08:34

## 2025-02-21 RX ADMIN — MAGNESIUM SULFATE HEPTAHYDRATE 1 G: 500 INJECTION, SOLUTION INTRAMUSCULAR; INTRAVENOUS at 08:40

## 2025-02-21 RX ADMIN — CEFAZOLIN 2000 MG: 2 INJECTION, POWDER, FOR SOLUTION INTRAMUSCULAR; INTRAVENOUS at 07:57

## 2025-02-21 RX ADMIN — ONDANSETRON 4 MG: 2 INJECTION INTRAMUSCULAR; INTRAVENOUS at 08:23

## 2025-02-21 RX ADMIN — ROCURONIUM BROMIDE 50 MG: 10 INJECTION, SOLUTION INTRAVENOUS at 08:10

## 2025-02-21 RX ADMIN — LIDOCAINE HYDROCHLORIDE 100 MG: 20 INJECTION, SOLUTION EPIDURAL; INFILTRATION; INTRACAUDAL; PERINEURAL at 08:10

## 2025-02-21 RX ADMIN — HYDROCODONE BITARTRATE AND ACETAMINOPHEN 1 TABLET: 5; 325 TABLET ORAL at 10:53

## 2025-02-21 RX ADMIN — SCOPOLAMINE 1 PATCH: 1.5 PATCH, EXTENDED RELEASE TRANSDERMAL at 07:51

## 2025-02-21 RX ADMIN — KETOROLAC TROMETHAMINE 30 MG: 30 INJECTION, SOLUTION INTRAMUSCULAR at 09:02

## 2025-02-21 RX ADMIN — DEXAMETHASONE SODIUM PHOSPHATE 4 MG: 4 INJECTION, SOLUTION INTRAMUSCULAR; INTRAVENOUS at 08:23

## 2025-02-21 NOTE — ANESTHESIA PREPROCEDURE EVALUATION
Anesthesia Evaluation     Patient summary reviewed and Nursing notes reviewed   history of anesthetic complications:   NPO Solid Status: > 8 hours             Airway   Mallampati: II  TM distance: >3 FB  Neck ROM: full  No difficulty expected  Dental - normal exam     Pulmonary - normal exam   (+) asthma,  Cardiovascular - normal exam    (+) hypertension      Neuro/Psych- negative ROS  GI/Hepatic/Renal/Endo    (+) obesity, diabetes mellitus type 1 well controlled    Musculoskeletal (-) negative ROS    Abdominal  - normal exam    Bowel sounds: normal.   Substance History - negative use     OB/GYN negative ob/gyn ROS         Other                    Anesthesia Plan    ASA 2     general       Anesthetic plan, risks, benefits, and alternatives have been provided, discussed and informed consent has been obtained with: patient.    CODE STATUS:

## 2025-02-21 NOTE — OP NOTE
Operative Report    Patient Name:  Karyn Zacarias  YOB: 1975    Date of Surgery:  2/21/2025    Pre-op Diagnosis:   Abdominal pain, cholelithiasis       Post-op Diagnosis:   Abdominal pain, cholelithiasis  Chronic cholecystitis    Procedure(s):  Robotic assisted laparoscopic cholecystectomy    Staff:  Surgeon(s):  Kell Jay MD    Circulator: Rodney Lott RN; Zahira Renteria RN  Scrub Person: Carly Tarango; Vesta De  Assistant: Carlos Ag CSFA  was responsible for performing the following activities: Closing, Placing Dressing, Held/Positioned Camera, and bedside assist for robotic case  and their skilled assistance was necessary for the success of this case.    Anesthesia: General    Estimated Blood Loss: Minimal    Implants:   None    Specimen:          Specimens       ID Source Type Tests Collected By Collected At Frozen?    A Gallbladder Tissue TISSUE PATHOLOGY EXAM   Kell Jay MD 2/21/25 0857 No    Description: Gallbladder            Findings: Critical view obtained.  Thickened peritoneal attachments and adherent omentum consistent with chronic cholecystitis biliary anatomy confirmed with indocyanine green.    Complications: None immediate    Clinical Indications: The patient is a 49 year old female with upper abdominal pain and cholelithiasis.  She has a history of gastroparesis and we discussed it is unclear whether her symptoms are secondary to gastroparesis or cholelithiasis.  Cholecystectomy was offered and she presents today for cholecystectomy. The surgery along with the associated risks, benefits, and alternatives were discussed with the patient. The risks include but are not limited to bleeding, infection, hernia, conversion to open, and common bile duct injury requiring additional procedures. The patient expressed understanding and wished to proceed. Informed consent was obtained.    Description of Procedure:  The patient was brought to the operating room  and placed in the supine position with both arms out. Bilateral sequential compression stockings placed on the lower extremities. The patient was induced and intubated by the Anesthesia service. Perioperative antibiotics were administered. The patient's abdomen was then prepped and draped in the usual sterile fashion. A time out was performed.    After confirming with anesthesia that an orogastric tube was in place and to suction we made a small stab incision in the left upper quadrant at [facility]. A water drop test was performed indicating we were likely intra-abdominal. Insufflation was initiated and a low opening pressure reassured us that we were intra-abdominal. We placed an 8mm robotic trocar just above the umbilicus. The camera was introduced and the abdomen was inspected to ensure we had not caused any injuries with placement of the veress needle or the initial trocar. Under direct visualization we placed a trocar one handbreadth to the left of the umbilicus and another one handbreadth to the right of the umbilicus. A fourth 8mm robotic trocar was placed in the left lateral subcostal region. The patient was placed in reverse Trendelenburg position with the right side up. The robot was docked and the camera and instruments loaded and positioned under direct visualization. At this point I sat down at the console to begin the dissection. The fundus of the gallbladder was retracted cephalad and laterally. Peritoneal attachments were taken down staying high on the gallbladder and away from the common bile duct. The cystic artery and cystic duct were dissected out and the critical view was obtained.  The inferior half of the gallbladder was dissected away from the liver revealing only two structures going directly to the gallbladder.  Peritoneal attachments were thickened consistent with chronic cholecystitis.   The patient had been administered indocyanine green prior to the start of the case and using  Firefly technology we were able to see the junction of the cystic duct with the common bile duct and confirm that biliary anatomy. The cystic artery and duct were clipped and divided. The gallbladder was then dissected from the gallbladder fossa with electrocautery. The gallbladder fossa was inspected and was hemostatic. The clips were inspected and were in good position. The gallbladder was placed in an endocatch bag and removed through the trocar site to the left of the umbilicus.  The fascial incision had to be bluntly expanded with a hemostat to allow removal of the gallbladder.  The fascial incision was closed with a transfascial stitch using 0 vicryl suture and the laparoscopic port closure device.  The fascial closure was checked and appeared intact.  The robot was undocked and the remaining ports were removed under direct visualization except for the port in the midline and all were hemostatic. Pneumoperitoneum was released and the supraumbilical trocar was removed.  The incisions were closed with 4-0 vicryl, cleaned, and dressed with sterile dressings.    The patient tolerated the procedure well.  She was awakened and extubated by anesthesia and then transferred to the recovery room in stable condition. At the completion of the case, all instrument, needle, and sponge counts were correct.     Kell Jay MD     Date: 2/21/2025  Time: 09:21 EST    This note was created using Dragon Voice Recognition software.

## 2025-02-21 NOTE — DISCHARGE INSTRUCTIONS
Call the office to schedule followup appointment approx 2 wks postop  Keep incisions dry for first 24-48 hrs then may remove overlying bandages but leave white steristrips in place  May shower soap and water after bandages are removed but no baths/soaking x 2 weeks  Low fat diet x 2 wks  No lifting >10-15 lbs x 2 wks  Over the counter stool softener twice daily until off narcotics and having regular bowel movements  Milk of magnesia as needed if still constipated with stool softeners   Do not drive while on narcotics

## 2025-02-21 NOTE — ANESTHESIA PROCEDURE NOTES
Airway  Urgency: elective    Date/Time: 2/21/2025 8:12 AM  Airway not difficult    General Information and Staff    Patient location during procedure: OR  CRNA/CAA: Teresa Padilla CRNA    Indications and Patient Condition  Indications for airway management: airway protection    Preoxygenated: yes  Mask difficulty assessment: 2 - vent by mask + OA or adjuvant +/- NMBA    Final Airway Details  Final airway type: endotracheal airway      Successful airway: ETT     Successful intubation technique: video laryngoscopy  Facilitating devices/methods: intubating stylet  Endotracheal tube insertion site: oral  Blade: Zuluaga  Blade size: 3  ETT size (mm): 7.0  Cormack-Lehane Classification: grade I - full view of glottis  Placement verified by: capnometry   Measured from: teeth  ETT/EBT  to teeth (cm): 21  Number of attempts at approach: 1  Assessment: lips, teeth, and gum same as pre-op and atraumatic intubation

## 2025-02-21 NOTE — ANESTHESIA POSTPROCEDURE EVALUATION
Patient: Karyn Zacarias    Procedure Summary       Date: 02/21/25 Room / Location: [facility] OR [address] [place] [address] MAIN OR    Anesthesia Start: 0803 Anesthesia Stop: 0932    Procedure: Robotic assisted laparoscopic cholecystectomy (Abdomen) Diagnosis:       Calculus of gallbladder without cholecystitis without obstruction      Upper abdominal pain      (Calculus of gallbladder without cholecystitis without obstruction [K80.20])      (Upper abdominal pain [R10.10])    Surgeons: Kell Jay MD Provider: Rickie Perez MD    Anesthesia Type: general ASA Status: 2            Anesthesia Type: general    Vitals  Vitals Value Taken Time   /59 02/21/25 1030   Temp 97.8 °F (36.6 °C) 02/21/25 1030   Pulse 83 02/21/25 1030   Resp 9 02/21/25 1030   SpO2 92 % 02/21/25 1030           Post Anesthesia Care and Evaluation    Patient location during evaluation: PACU  Patient participation: complete - patient participated  Level of consciousness: awake  Pain score: 0  Pain management: adequate  Anesthetic complications: No anesthetic complications  PONV Status: none  Cardiovascular status: acceptable  Respiratory status: acceptable  Hydration status: acceptable

## 2025-02-24 LAB
LAB AP CASE REPORT: NORMAL
PATH REPORT.FINAL DX SPEC: NORMAL
PATH REPORT.GROSS SPEC: NORMAL

## 2025-02-25 ENCOUNTER — TELEPHONE (OUTPATIENT)
Dept: SURGERY | Facility: CLINIC | Age: 50
End: 2025-02-25
Payer: COMMERCIAL

## 2025-02-25 NOTE — TELEPHONE ENCOUNTER
Call placed to patient to follow up after surgery and to discuss follow up appointment. Left message on machine and encouraged to call with any questions/ concerns regarding her surgery ahead of her post op appt on 3/10/2025 with Dr. Vahe Levin for [facility].

## 2025-02-26 NOTE — TELEPHONE ENCOUNTER
Spoke with pt. States having a lot of lt side pain. Informed to add ibuprofen in between pain meds and to use gas-x as well as an ice pack. Pt stated understood. And will call if worsens or does not stop.

## 2025-03-07 ENCOUNTER — OFFICE (AMBULATORY)
Dept: URBAN - METROPOLITAN AREA PATHOLOGY 19 | Facility: PATHOLOGY | Age: 50
End: 2025-03-07
Payer: COMMERCIAL

## 2025-03-07 ENCOUNTER — ON CAMPUS - OUTPATIENT (AMBULATORY)
Dept: URBAN - METROPOLITAN AREA HOSPITAL 2 | Facility: HOSPITAL | Age: 50
End: 2025-03-07
Payer: COMMERCIAL

## 2025-03-07 VITALS
SYSTOLIC BLOOD PRESSURE: 90 MMHG | DIASTOLIC BLOOD PRESSURE: 82 MMHG | HEART RATE: 102 BPM | HEART RATE: 89 BPM | HEART RATE: 98 BPM | HEART RATE: 95 BPM | DIASTOLIC BLOOD PRESSURE: 49 MMHG | SYSTOLIC BLOOD PRESSURE: 127 MMHG | OXYGEN SATURATION: 99 % | DIASTOLIC BLOOD PRESSURE: 622 MMHG | HEART RATE: 94 BPM | OXYGEN SATURATION: 98 % | DIASTOLIC BLOOD PRESSURE: 69 MMHG | DIASTOLIC BLOOD PRESSURE: 67 MMHG | OXYGEN SATURATION: 100 % | WEIGHT: 190.8 LBS | SYSTOLIC BLOOD PRESSURE: 137 MMHG | HEART RATE: 88 BPM | SYSTOLIC BLOOD PRESSURE: 125 MMHG | DIASTOLIC BLOOD PRESSURE: 85 MMHG | SYSTOLIC BLOOD PRESSURE: 129 MMHG | RESPIRATION RATE: 16 BRPM | SYSTOLIC BLOOD PRESSURE: 134 MMHG | SYSTOLIC BLOOD PRESSURE: 131 MMHG | HEART RATE: 87 BPM | HEART RATE: 90 BPM | DIASTOLIC BLOOD PRESSURE: 73 MMHG | RESPIRATION RATE: 17 BRPM | SYSTOLIC BLOOD PRESSURE: 112 MMHG | RESPIRATION RATE: 18 BRPM | DIASTOLIC BLOOD PRESSURE: 47 MMHG | TEMPERATURE: 97 F | DIASTOLIC BLOOD PRESSURE: 77 MMHG | HEIGHT: 62 IN | SYSTOLIC BLOOD PRESSURE: 109 MMHG | RESPIRATION RATE: 15 BRPM | SYSTOLIC BLOOD PRESSURE: 97 MMHG | OXYGEN SATURATION: 97 %

## 2025-03-07 DIAGNOSIS — Z80.0 FAMILY HISTORY OF MALIGNANT NEOPLASM OF DIGESTIVE ORGANS: ICD-10-CM

## 2025-03-07 DIAGNOSIS — Z12.11 ENCOUNTER FOR SCREENING FOR MALIGNANT NEOPLASM OF COLON: ICD-10-CM

## 2025-03-07 DIAGNOSIS — R13.10 DYSPHAGIA, UNSPECIFIED: ICD-10-CM

## 2025-03-07 DIAGNOSIS — Z15.09 GENETIC SUSCEPTIBILITY TO OTHER MALIGNANT NEOPLASM: ICD-10-CM

## 2025-03-07 DIAGNOSIS — K31.89 OTHER DISEASES OF STOMACH AND DUODENUM: ICD-10-CM

## 2025-03-07 PROBLEM — K29.70 GASTRITIS, UNSPECIFIED, WITHOUT BLEEDING: Status: ACTIVE | Noted: 2025-03-07

## 2025-03-07 LAB
GI HISTOLOGY: A. STOMACH ANTRUM: (no result)
GI HISTOLOGY: PDF REPORT: (no result)

## 2025-03-07 PROCEDURE — 43450 DILATE ESOPHAGUS 1/MULT PASS: CPT | Performed by: INTERNAL MEDICINE

## 2025-03-07 PROCEDURE — 88342 IMHCHEM/IMCYTCHM 1ST ANTB: CPT | Performed by: PATHOLOGY

## 2025-03-07 PROCEDURE — 88305 TISSUE EXAM BY PATHOLOGIST: CPT | Performed by: PATHOLOGY

## 2025-03-07 PROCEDURE — 43239 EGD BIOPSY SINGLE/MULTIPLE: CPT | Performed by: INTERNAL MEDICINE

## 2025-03-07 PROCEDURE — G0105 COLORECTAL SCRN; HI RISK IND: HCPCS | Performed by: INTERNAL MEDICINE

## 2025-03-07 RX ADMIN — ONDANSETRON HYDROCHLORIDE 4 MG: 4 SOLUTION ORAL at 10:23

## 2025-03-09 NOTE — PROGRESS NOTES
"General Surgery Post-Operative Follow Up Note     Subjective:  Karyn Zacarias is a 49 y.o. year old female here for post-operative follow up.  Reports pain to the left of the umbilicus which we discussed is where we extracted the gallbladder and a transfascial stitch was placed.  Her upper abdominal pain as well as nausea and vomiting are improved postoperatively as we had previously discussed it was unclear whether her pain was secondary to chronic cholecystitis/cholelithiasis versus gastroparesis.    Procedure:    Robotic assisted laparoscopic cholecystectomy, 2/21/2025    Pathology:    Gallbladder, cholecystectomy:    Chronic cholecystitis    No evidence of malignancy    Vitals:  /79 (BP Location: Left arm, Patient Position: Sitting, Cuff Size: Large Adult)   Pulse 100   Temp 97.3 °F (36.3 °C) (Infrared)   Ht 157.5 cm (62\")   Wt 88.9 kg (196 lb)   SpO2 96%   BMI 35.85 kg/m²      Physical Exam:   NAD, alert  Nonlabored respirations  Abdomen soft, NT/ND, no incisional hernias appreciated, incisions healing well    Assessment and Plan:  Karyn Zacarias is a 49 y.o. year old female status post robotic assisted laparoscopic cholecystectomy, doing well.    -Pathology reviewed  -We discussed the left-sided abdominal pain will continue to improve over time and is likely secondary to the transfascial stitch  -No further activity or dietary restrictions  -Followup on PRN basis    Kell Jay M.D.  General Surgery  "

## 2025-03-10 ENCOUNTER — OFFICE VISIT (OUTPATIENT)
Dept: SURGERY | Facility: CLINIC | Age: 50
End: 2025-03-10
Payer: COMMERCIAL

## 2025-03-10 VITALS
DIASTOLIC BLOOD PRESSURE: 79 MMHG | OXYGEN SATURATION: 96 % | BODY MASS INDEX: 36.07 KG/M2 | TEMPERATURE: 97.3 F | HEART RATE: 100 BPM | HEIGHT: 62 IN | WEIGHT: 196 LBS | SYSTOLIC BLOOD PRESSURE: 122 MMHG

## 2025-03-10 DIAGNOSIS — Z09 POSTOP CHECK: Primary | ICD-10-CM

## 2025-03-10 DIAGNOSIS — E10.65 TYPE 1 DIABETES MELLITUS WITH HYPERGLYCEMIA: ICD-10-CM

## 2025-03-10 PROCEDURE — 99024 POSTOP FOLLOW-UP VISIT: CPT | Performed by: SURGERY

## 2025-03-10 RX ORDER — PROCHLORPERAZINE 25 MG/1
SUPPOSITORY RECTAL
Qty: 9 EACH | Refills: 3 | Status: SHIPPED | OUTPATIENT
Start: 2025-03-10

## 2025-03-10 RX ORDER — ROSUVASTATIN CALCIUM 20 MG/1
20 TABLET, COATED ORAL DAILY
COMMUNITY
Start: 2025-02-12

## 2025-03-17 DIAGNOSIS — E10.65 TYPE 1 DIABETES MELLITUS WITH HYPERGLYCEMIA: ICD-10-CM

## 2025-03-17 RX ORDER — PROCHLORPERAZINE 25 MG/1
SUPPOSITORY RECTAL
Qty: 3 EACH | Refills: 3 | Status: SHIPPED | OUTPATIENT
Start: 2025-03-17

## 2025-03-30 DIAGNOSIS — E10.65 TYPE 1 DIABETES MELLITUS WITH HYPERGLYCEMIA: Primary | ICD-10-CM

## 2025-05-28 ENCOUNTER — TELEPHONE (OUTPATIENT)
Dept: ENDOCRINOLOGY | Facility: CLINIC | Age: 50
End: 2025-05-28
Payer: COMMERCIAL

## 2025-06-04 ENCOUNTER — TELEPHONE (OUTPATIENT)
Dept: ENDOCRINOLOGY | Facility: CLINIC | Age: 50
End: 2025-06-04
Payer: COMMERCIAL

## 2025-06-04 NOTE — TELEPHONE ENCOUNTER
Caller: Karyn Zacarias    Relationship: Self    Best call back number:   Telephone Information:   Mobile [phone]         What orders are you requesting (i.e. lab or imaging): LABS    In what timeframe would the patient need to come in: ASAP    Where will you receive your lab/imaging services: [facility]   FAX # [phone]    Additional notes: PT CALLED NEEDING ORDERS SENT TO [facility] , PT IS CURRENTLY AT HOSPITAL WAITING FOR ORDERS. PLEASE ADVISE.

## 2025-06-05 LAB — HBA1C MFR BLD: 7.4 %

## 2025-06-09 DIAGNOSIS — E10.65 TYPE 1 DIABETES MELLITUS WITH HYPERGLYCEMIA: ICD-10-CM

## 2025-06-09 RX ORDER — PROCHLORPERAZINE 25 MG/1
SUPPOSITORY RECTAL
Qty: 3 EACH | Refills: 3 | Status: SHIPPED | OUTPATIENT
Start: 2025-06-09

## 2025-06-10 ENCOUNTER — OFFICE VISIT (OUTPATIENT)
Dept: ENDOCRINOLOGY | Facility: CLINIC | Age: 50
End: 2025-06-10
Payer: COMMERCIAL

## 2025-06-10 VITALS
HEIGHT: 62 IN | HEART RATE: 108 BPM | SYSTOLIC BLOOD PRESSURE: 125 MMHG | DIASTOLIC BLOOD PRESSURE: 75 MMHG | BODY MASS INDEX: 36.8 KG/M2 | OXYGEN SATURATION: 96 % | WEIGHT: 200 LBS

## 2025-06-10 DIAGNOSIS — E66.812 CLASS 2 SEVERE OBESITY DUE TO EXCESS CALORIES WITH SERIOUS COMORBIDITY AND BODY MASS INDEX (BMI) OF 37.0 TO 37.9 IN ADULT: ICD-10-CM

## 2025-06-10 DIAGNOSIS — E10.65 TYPE 1 DIABETES MELLITUS WITH HYPERGLYCEMIA: Primary | ICD-10-CM

## 2025-06-10 DIAGNOSIS — E78.2 MIXED HYPERLIPIDEMIA: ICD-10-CM

## 2025-06-10 DIAGNOSIS — E66.01 CLASS 2 SEVERE OBESITY DUE TO EXCESS CALORIES WITH SERIOUS COMORBIDITY AND BODY MASS INDEX (BMI) OF 37.0 TO 37.9 IN ADULT: ICD-10-CM

## 2025-06-10 PROCEDURE — 99214 OFFICE O/P EST MOD 30 MIN: CPT | Performed by: INTERNAL MEDICINE

## 2025-06-10 PROCEDURE — 95251 CONT GLUC MNTR ANALYSIS I&R: CPT | Performed by: INTERNAL MEDICINE

## 2025-06-10 RX ORDER — INSULIN LISPRO 100 [IU]/ML
INJECTION, SOLUTION INTRAVENOUS; SUBCUTANEOUS
Qty: 90 ML | Refills: 3 | Status: SHIPPED | OUTPATIENT
Start: 2025-06-10

## 2025-06-10 NOTE — PROGRESS NOTES
Endocrine Progress Note Outpatient     Patient Care Team:  Manda Raines APRN as PCP - General (Nurse Practitioner)  Sage Cobos MD as Consulting Physician (Cardiology)  Kell Jay MD as Surgeon (General Surgery)    Chief Complaint: Follow-up type 1 diabetes:     HPI: 49-year-old female with history of type 1 diabetes and hyperlipidemia is here for follow-up.    For type 1 diabetes she is currently on OmniPod 5 system with Dexcom.  She is currently using NovoLog insulin in the pump.      Her current insulin pump settings are as follows basal rate at midnight midnight is 1.80 units/h. Her insulin carb ratio is 1 unit for each 4 g of carbohydrate with a correction scale of 1 unit for each 20-mg blood sugar with a target blood sugar 110 and active insulin 3 hours.    She is also on Zepbound 2.5 mg sq weekly through a tele company and its compound product. She has losr 26 lbs since 08/09/2024.     Hyperlipidemia: She is currently on Crestor.    She is on lisinopril for renal protection.    She is still going through stress and emotional trauma from her son's passing.    Past Medical History:   Diagnosis Date    Cholelithiasis 1/24/25    [facility]    Colon polyp 1/2024    2 small polyps no issues    DKA (diabetic ketoacidoses)     Bojorquez syndrome     PONV (postoperative nausea and vomiting)     Skin cancer        Social History     Socioeconomic History    Marital status:      Spouse name: Markus    Number of children: 2    Years of education: 14   Tobacco Use    Smoking status: Former     Current packs/day: 1.00     Average packs/day: 1 pack/day for 20.0 years (20.0 ttl pk-yrs)     Types: Cigarettes, Electronic Cigarette     Passive exposure: Past    Smokeless tobacco: Never   Vaping Use    Vaping status: Former    Substances: Flavoring    Devices: Pre-filled pod   Substance and Sexual Activity    Alcohol use: Never    Drug use: Never    Sexual activity: Yes     Partners: Male     Birth  "control/protection: None, Hysterectomy       Family History   Problem Relation Age of Onset    Cancer Mother         rectal / colon / skin    Heart disease Father     Hypertension Father     Post-traumatic stress disorder Father     Cancer Sister         Skin    Diabetes Sister     Depression Sister     Early death Son     Mental illness Son        Allergies   Allergen Reactions    Azithromycin Other (See Comments)     rash  rash      Penicillins Rash     severe rash  Beta lactam allergy details  Antibiotic reaction: rash, nausea  Age at reaction: unknown  Dose to reaction time: unknown  Reason for antibiotic: unknown  Epinephrine required for reaction?: unknown  Tolerated antibiotics: unknown       Seasonal Ic  [Cholestatin] Itching     Other reaction(s): Erythema (finding)  Adhesives; \"Paper Tape is OK\" - per pt    Erythromycin Base Rash     childhood reaction    Latex Rash    Sulfa Antibiotics Rash     rash         ROS:   Constitutional:  Denies fatigue, tiredness.    Eyes:  Denies change in visual acuity   HENT:  Denies nasal congestion or sore throat   Respiratory: denies cough, shortness of breath.   Cardiovascular:  denies chest pain, edema   GI:  Denies abdominal pain, nausea, vomiting.   Musculoskeletal:  Denies back pain or joint pain   Integument:  Denies dry skin and rash   Neurologic:  Denies headache, focal weakness or sensory changes   Endocrine:  Denies polyuria or polydipsia   Psychiatric:  Denies depression or anxiety      Vitals:    06/10/25 1405   BP: 125/75   Pulse: 108   SpO2: 96%     BMI: 36.6  Physical Exam:  GEN: NAD, conversant  CHEST' CTA  CVS: RRR  PSYCH: AOX3, appropriate mood, affect normal      Results Review:     I reviewed the patient's new clinical results.    Lab Results   Component Value Date    HGBA1C 7.4 06/04/2025    HGBA1C 7 (H) 08/06/2024    HGBA1C 9.3 (H) 05/23/2022        LIPID PANEL (06/04/2025 09:50)    HEMOGLOBIN A1C (06/04/2025 09:50)    Dexcom download interpretation: " Dates cover was between May 28, 2025 to Selena 10, 2025.  Average blood sugar was 173.  Spending about 57% in the range, 42% above range and 1% below range.    Medication Review: Reviewed.       Current Outpatient Medications:     Blood Glucose Monitoring Suppl (Contour Next One) device, USE DAILY, Disp: 1 each, Rfl: 0    Blood Glucose Monitoring Suppl (Contour Next One) kit, Use 1 each Daily., Disp: 1 kit, Rfl: 0    buPROPion (WELLBUTRIN) 75 MG tablet, Take 1 tablet by mouth Every Night., Disp: , Rfl:     clonazePAM (KlonoPIN) 0.5 MG tablet, Take 1 tablet by mouth At Night As Needed for Anxiety., Disp: , Rfl:     Continuous Blood Gluc  (Dexcom G6 ) device, Use 1 Device Daily., Disp: 1 each, Rfl: 1    Continuous Glucose Sensor (Dexcom G6 Sensor), Use Every 10 (Ten) Days. Dx:E10.65, Disp: 3 each, Rfl: 3    Continuous Glucose Transmitter (Dexcom G6 Transmitter) misc, USE 1 DEVICE EVERY 3 MONTHS, Disp: 3 each, Rfl: 3    escitalopram (LEXAPRO) 20 MG tablet, Take 1 tablet by mouth every night at bedtime., Disp: , Rfl:     glucagon (GLUCAGEN) 1 MG injection, INJECT 1 MG SUBCUTANEOUSLY INTO  THE APPROPRIATE AREA AS DIRECTED SEE ADMINISTER INSTRUCTIONS.  FOLLOW PACKAGE DIRECTIONS FOR  LOW BLOOD SUGAR, Disp: 2 each, Rfl: 3    glucose blood (Contour Next Test) test strip, USE TO TEST BLOOD SUGARS UP TO 6 TIMES DAILY, Disp: 600 each, Rfl: 3    Insulin Disposable Pump (Omnipod 5 [id number] Pods Gen 5) misc, CHANGE 1 POD EVERY OTHER DAY, Disp: 45 each, Rfl: 3    Insulin Disposable Pump (Omnipod 5 G6 Intro, Gen 5,) kit, Use 1 package Daily., Disp: 1 kit, Rfl: 0    Insulin Glargine (Lantus SoloStar) 100 UNIT/ML injection pen, Inject 40 units sq daily., Disp: 45 mL, Rfl: 2    Insulin Lispro (HumaLOG KwikPen) 200 UNIT/ML solution pen-injector, Inject 45 Units under the skin into the appropriate area as directed Daily. Use as directed in insulin pump. MDD: 50 units per day, Disp: 30 mL, Rfl: 3    Insulin Pen Needle 31G X 5  MM misc, Use 1 each Daily., Disp: 100 each, Rfl: 2    Linzess 145 MCG capsule capsule, TAKE 1 CAPSULE BY MOUTH EVERY MORNING 30 TO 60 MINUTES BEFORE A MEAL, Disp: , Rfl:     metoclopramide (REGLAN) 5 MG tablet, TAKE 1 TABLET BY MOUTH FOUR TIMES DAILY BEFORE A MEAL AND AT BEDTIME AS DIRECTED, Disp: , Rfl:     Microlet Lancets misc, USE TO TEST BLOOD SUGARS UP TO 6 TIMES DAILY, Disp: 600 each, Rfl: 2    omeprazole (priLOSEC) 40 MG capsule, Take 1 capsule by mouth Daily. (Patient taking differently: Take 1 capsule by mouth Daily As Needed.), Disp: 30 capsule, Rfl: 0    ondansetron ODT (ZOFRAN-ODT) 8 MG disintegrating tablet, Place 1 tablet on the tongue Every 8 (Eight) Hours As Needed for Nausea or Vomiting., Disp: 12 tablet, Rfl: 0    QUEtiapine (SEROquel) 50 MG tablet, Take 3 tablets by mouth Every Night., Disp: , Rfl:     rosuvastatin (CRESTOR) 20 MG tablet, Take 1 tablet by mouth Daily., Disp: , Rfl:     SUMAtriptan (IMITREX) 100 MG tablet, Take 1 tablet by mouth 1 (One) Time As Needed for Migraine., Disp: , Rfl:     Tirzepatide-Weight Management (ZEPBOUND) 5 MG/0.5ML solution auto-injector, Inject 5 mg subcu weekly. (Patient taking differently: Inject 0.5 mL under the skin into the appropriate area as directed 1 (One) Time Per Week. Inject 5 mg subcu weekly.   Sun), Disp: , Rfl:       Assessment & Plan   1.  Diabetes mellitus type 1 with hyperglycemia: Uncontrolled but is stable with A1c at 7.4%.  I have reviewed her Dexcom download.  This time we have decided to continue current regimen and follow blood sugars.  Advised to always keep glucose source in case of low blood sugars.    2.  Hyperlipidemia: Improving, continue rosuvastatin.    3.  Class II obesity: Recommend to continue to work on diet and activity.    Assessment and plan from November 5, 2024 reviewed and updated.            Tawanda Lorenz MD FACE.    Much of the above report is an electronic transcription/translation of the spoken language to printed  text using Dragon Software. As such, the subtleties and finesse of the spoken language may permit erroneous, or at times, nonsensical words or phrases to be inadvertently transcribed; thus changes may be made at a later date to rectify these errors.

## (undated) DEVICE — GENERAL LAPAROSCOPY CDS: Brand: MEDLINE INDUSTRIES, INC.

## (undated) DEVICE — ENDOPATH XCEL BLADELESS TROCARS WITH STABILITY SLEEVES: Brand: ENDOPATH XCEL

## (undated) DEVICE — BLADELESS OBTURATOR: Brand: WECK VISTA

## (undated) DEVICE — GLOVE,SURG,SENSICARE SLT,LF,PF,6: Brand: MEDLINE

## (undated) DEVICE — GAUZE,SPONGE,4"X4",16PLY,XRAY,STRL,LF: Brand: MEDLINE

## (undated) DEVICE — GLV SURG SENSICARE POLYISPRN W/ALOE PF LF 6.5 GRN STRL

## (undated) DEVICE — KT SURG TURNOVER 050

## (undated) DEVICE — SEAL

## (undated) DEVICE — PASS SUT PRO BARIATRIC XL W/TROC SWABS

## (undated) DEVICE — BG RETRV TISS SUPERBAG INTRO RIP/STOP NLY 5/7MM 140ML MD

## (undated) DEVICE — THE STERILE LIGHT HANDLE COVER IS USED WITH STERIS SURGICAL LIGHTING AND VISUALIZATION SYSTEMS.

## (undated) DEVICE — ANTIBACTERIAL UNDYED BRAIDED (POLYGLACTIN 910), SYNTHETIC ABSORBABLE SUTURE: Brand: COATED VICRYL

## (undated) DEVICE — SUT VIC 0 UR6 27IN VCP603H

## (undated) DEVICE — ENDOPATH PNEUMONEEDLE INSUFFLATION NEEDLES WITH LUER LOCK CONNECTORS 120MM: Brand: ENDOPATH

## (undated) DEVICE — THE STERILE CAMERA HANDLE COVER IS FOR USE WITH THE STERIS SURGICAL LIGHTING AND VISUALIZATION SYSTEMS.

## (undated) DEVICE — TBG INSUFF MALE L/L W 12MM CON: Brand: MEDLINE INDUSTRIES, INC.

## (undated) DEVICE — ARM DRAPE

## (undated) DEVICE — PAD, GROUNDING, UNIVERSAL, SPLIT, 9': Brand: MEDLINE

## (undated) DEVICE — BLANKT WARM UPPR/BDY ARM/OUT 57X196CM

## (undated) DEVICE — COLUMN DRAPE